# Patient Record
Sex: MALE | Race: WHITE | Employment: OTHER | URBAN - METROPOLITAN AREA
[De-identification: names, ages, dates, MRNs, and addresses within clinical notes are randomized per-mention and may not be internally consistent; named-entity substitution may affect disease eponyms.]

---

## 2017-01-11 ENCOUNTER — OFFICE VISIT (OUTPATIENT)
Dept: FAMILY MEDICINE CLINIC | Facility: CLINIC | Age: 67
End: 2017-01-11

## 2017-01-11 VITALS
TEMPERATURE: 98 F | SYSTOLIC BLOOD PRESSURE: 139 MMHG | HEART RATE: 83 BPM | HEIGHT: 69 IN | WEIGHT: 193 LBS | RESPIRATION RATE: 18 BRPM | BODY MASS INDEX: 28.58 KG/M2 | DIASTOLIC BLOOD PRESSURE: 84 MMHG

## 2017-01-11 DIAGNOSIS — L30.9 DERMATITIS: Primary | ICD-10-CM

## 2017-01-11 PROCEDURE — 99213 OFFICE O/P EST LOW 20 MIN: CPT | Performed by: FAMILY MEDICINE

## 2017-01-11 PROCEDURE — 99212 OFFICE O/P EST SF 10 MIN: CPT | Performed by: FAMILY MEDICINE

## 2017-01-11 RX ORDER — DESONIDE 0.5 MG/G
1 CREAM TOPICAL 2 TIMES DAILY
Qty: 1 TUBE | Refills: 1 | Status: SHIPPED | OUTPATIENT
Start: 2017-01-11 | End: 2017-07-07 | Stop reason: ALTCHOICE

## 2017-01-11 NOTE — PROGRESS NOTES
Blood pressure 139/84, pulse 83, temperature 97.8 °F (36.6 °C), temperature source Oral, resp. rate 18, height 5' 9\" (1.753 m), weight 193 lb (87.544 kg). Patient presents today complaining of an erythematous rash underneath his right eye.   He had been p

## 2017-01-25 ENCOUNTER — TELEPHONE (OUTPATIENT)
Dept: FAMILY MEDICINE CLINIC | Facility: CLINIC | Age: 67
End: 2017-01-25

## 2017-01-25 NOTE — TELEPHONE ENCOUNTER
Pt calling wanting to let  [facility] know that the medication Desonide (DESOWEN) 0.05 % External Cream worked. .. please advise

## 2017-03-30 ENCOUNTER — OFFICE VISIT (OUTPATIENT)
Dept: DERMATOLOGY CLINIC | Facility: CLINIC | Age: 67
End: 2017-03-30

## 2017-03-30 DIAGNOSIS — L82.1 SEBORRHEIC KERATOSES: ICD-10-CM

## 2017-03-30 DIAGNOSIS — D23.9 BENIGN NEOPLASM OF SKIN, UNSPECIFIED LOCATION: ICD-10-CM

## 2017-03-30 DIAGNOSIS — L30.9 DERMATITIS: Primary | ICD-10-CM

## 2017-03-30 PROCEDURE — 99212 OFFICE O/P EST SF 10 MIN: CPT | Performed by: DERMATOLOGY

## 2017-03-30 PROCEDURE — 99202 OFFICE O/P NEW SF 15 MIN: CPT | Performed by: DERMATOLOGY

## 2017-04-17 NOTE — PROGRESS NOTES
Kolby Herman is a 61year old male. Patient presents with:  Lesion: New pt presenting with lesions to R lower eye lid. Currnetly using desonide 0.05% cream.             Review of patient's allergies indicates no known allergies.     Current Outpatient P Pt has a pacemaker No    Pt has a defibrillator No    Reaction to local anesthetic No     Social History Narrative     Family History   Problem Relation Age of Onset   • Breast Cancer Mother    • Diabetes Paternal Grandfather    • Other[other] Zoe SABA know how they are doing over the next several weeks. Await clinical response to above therapy. Multiple benign keratoses  , Skin tags reassurance.   Consider removal with ophthalmology given the location right mid lower lid along lash line    RTC as not

## 2017-06-28 ENCOUNTER — TELEPHONE (OUTPATIENT)
Dept: FAMILY MEDICINE CLINIC | Facility: CLINIC | Age: 67
End: 2017-06-28

## 2017-06-28 DIAGNOSIS — E78.2 MIXED HYPERLIPIDEMIA: Primary | ICD-10-CM

## 2017-06-28 NOTE — TELEPHONE ENCOUNTER
Pt has appt with [facility] on 7-7-17 and would like to do his labwork before this, please send an order and call pt to let him know when its ready

## 2017-06-28 NOTE — TELEPHONE ENCOUNTER
Pt is requesting an order for lab before OV 07/0717. Last lab dated 11/10/16. Is it ok to generate the order? Thank you.

## 2017-07-06 ENCOUNTER — APPOINTMENT (OUTPATIENT)
Dept: LAB | Age: 67
End: 2017-07-06
Attending: FAMILY MEDICINE
Payer: MEDICARE

## 2017-07-06 DIAGNOSIS — E78.2 MIXED HYPERLIPIDEMIA: ICD-10-CM

## 2017-07-06 LAB
ALT SERPL-CCNC: 25 U/L (ref 17–63)
AST SERPL-CCNC: 26 U/L (ref 15–41)
CHOLEST SERPL-MCNC: 155 MG/DL (ref 110–200)
GLUCOSE SERPL-MCNC: 94 MG/DL (ref 70–99)
HDLC SERPL-MCNC: 60 MG/DL
LDLC SERPL CALC-MCNC: 76 MG/DL (ref 0–99)
NONHDLC SERPL-MCNC: 95 MG/DL
TRIGL SERPL-MCNC: 94 MG/DL (ref 1–149)

## 2017-07-06 PROCEDURE — 82947 ASSAY GLUCOSE BLOOD QUANT: CPT

## 2017-07-06 PROCEDURE — 84450 TRANSFERASE (AST) (SGOT): CPT

## 2017-07-06 PROCEDURE — 36415 COLL VENOUS BLD VENIPUNCTURE: CPT

## 2017-07-06 PROCEDURE — 84460 ALANINE AMINO (ALT) (SGPT): CPT

## 2017-07-06 PROCEDURE — 80061 LIPID PANEL: CPT

## 2017-07-07 ENCOUNTER — OFFICE VISIT (OUTPATIENT)
Dept: FAMILY MEDICINE CLINIC | Facility: CLINIC | Age: 67
End: 2017-07-07

## 2017-07-07 VITALS
DIASTOLIC BLOOD PRESSURE: 74 MMHG | BODY MASS INDEX: 29 KG/M2 | WEIGHT: 196.81 LBS | TEMPERATURE: 98 F | HEART RATE: 74 BPM | SYSTOLIC BLOOD PRESSURE: 130 MMHG

## 2017-07-07 DIAGNOSIS — E78.2 MIXED HYPERLIPIDEMIA: Primary | ICD-10-CM

## 2017-07-07 DIAGNOSIS — M54.50 CHRONIC MIDLINE LOW BACK PAIN WITHOUT SCIATICA: ICD-10-CM

## 2017-07-07 DIAGNOSIS — I10 ESSENTIAL HYPERTENSION: ICD-10-CM

## 2017-07-07 DIAGNOSIS — G89.29 CHRONIC MIDLINE LOW BACK PAIN WITHOUT SCIATICA: ICD-10-CM

## 2017-07-07 PROCEDURE — G0463 HOSPITAL OUTPT CLINIC VISIT: HCPCS | Performed by: FAMILY MEDICINE

## 2017-07-07 PROCEDURE — 99213 OFFICE O/P EST LOW 20 MIN: CPT | Performed by: FAMILY MEDICINE

## 2017-07-07 RX ORDER — LOSARTAN POTASSIUM 50 MG/1
50 TABLET ORAL DAILY
Qty: 90 TABLET | Refills: 3 | Status: SHIPPED | OUTPATIENT
Start: 2017-07-07 | End: 2017-09-29

## 2017-07-07 RX ORDER — ATORVASTATIN CALCIUM 20 MG/1
TABLET, FILM COATED ORAL
Qty: 90 TABLET | Refills: 3 | Status: SHIPPED | OUTPATIENT
Start: 2017-07-07 | End: 2017-09-29

## 2017-07-07 RX ORDER — CYCLOBENZAPRINE HCL 10 MG
10 TABLET ORAL NIGHTLY
Qty: 90 TABLET | Refills: 1 | Status: SHIPPED | OUTPATIENT
Start: 2017-07-07 | End: 2017-07-27

## 2017-07-07 RX ORDER — CYCLOBENZAPRINE HCL 10 MG
10 TABLET ORAL NIGHTLY
Qty: 30 TABLET | Refills: 1 | Status: SHIPPED | OUTPATIENT
Start: 2017-07-07 | End: 2017-07-07

## 2017-07-07 NOTE — PROGRESS NOTES
Blood pressure 130/74, pulse 74, temperature 98.2 °F (36.8 °C), temperature source Oral, weight 196 lb 12.8 oz (89.3 kg). FU FOR HTN AND hyperlipidemia complaining of chronic low back stiffness especially in the mornings. Remote history of trauma.     O

## 2017-09-29 ENCOUNTER — TELEPHONE (OUTPATIENT)
Dept: FAMILY MEDICINE CLINIC | Facility: CLINIC | Age: 67
End: 2017-09-29

## 2017-09-29 RX ORDER — LOSARTAN POTASSIUM 50 MG/1
50 TABLET ORAL DAILY
Qty: 90 TABLET | Refills: 2 | Status: SHIPPED | OUTPATIENT
Start: 2017-09-29 | End: 2017-11-10

## 2017-09-29 RX ORDER — ATORVASTATIN CALCIUM 20 MG/1
TABLET, FILM COATED ORAL
Qty: 90 TABLET | Refills: 2 | Status: SHIPPED | OUTPATIENT
Start: 2017-09-29 | End: 2018-08-08

## 2017-09-29 NOTE — TELEPHONE ENCOUNTER
Refilled per protocol.       Hypertensive Medications  Protocol Criteria:  · Appointment scheduled in the past 6 months or in the next 3 months  · BMP or CMP in the past 12 months  · Creatinine result < 2  Recent Outpatient Visits            2 months ago Mi

## 2017-09-29 NOTE — TELEPHONE ENCOUNTER
Tabitha Hanna pt is out of town and forgot his meds at home requesting refill. Please advise. Current Outpatient Prescriptions:  Losartan Potassium 50 MG Oral Tab Take 1 tablet (50 mg total) by mouth daily.  Disp: 90 tablet Rfl: 3   atorvastatin 20

## 2017-10-13 ENCOUNTER — PATIENT OUTREACH (OUTPATIENT)
Dept: INTERNAL MEDICINE CLINIC | Facility: CLINIC | Age: 67
End: 2017-10-13

## 2017-10-18 ENCOUNTER — OFFICE VISIT (OUTPATIENT)
Dept: FAMILY MEDICINE CLINIC | Facility: CLINIC | Age: 67
End: 2017-10-18

## 2017-10-18 VITALS
WEIGHT: 193 LBS | SYSTOLIC BLOOD PRESSURE: 148 MMHG | BODY MASS INDEX: 28.26 KG/M2 | HEIGHT: 69.25 IN | DIASTOLIC BLOOD PRESSURE: 86 MMHG

## 2017-10-18 DIAGNOSIS — Z00.00 WELCOME TO MEDICARE PREVENTIVE VISIT: Primary | ICD-10-CM

## 2017-10-18 DIAGNOSIS — E78.2 MIXED HYPERLIPIDEMIA: ICD-10-CM

## 2017-10-18 DIAGNOSIS — Z12.5 PROSTATE CANCER SCREENING: ICD-10-CM

## 2017-10-18 DIAGNOSIS — I10 ESSENTIAL HYPERTENSION: ICD-10-CM

## 2017-10-18 DIAGNOSIS — Z00.00 ENCOUNTER FOR ANNUAL HEALTH EXAMINATION: ICD-10-CM

## 2017-10-18 PROCEDURE — 90670 PCV13 VACCINE IM: CPT | Performed by: FAMILY MEDICINE

## 2017-10-18 PROCEDURE — G0009 ADMIN PNEUMOCOCCAL VACCINE: HCPCS | Performed by: FAMILY MEDICINE

## 2017-10-18 PROCEDURE — G0402 INITIAL PREVENTIVE EXAM: HCPCS | Performed by: FAMILY MEDICINE

## 2017-10-18 RX ORDER — ASPIRIN 81 MG/1
81 TABLET ORAL DAILY
Qty: 30 TABLET | Refills: 4 | OUTPATIENT
Start: 2017-10-18 | End: 2021-08-03

## 2017-10-18 NOTE — PROGRESS NOTES
HPI:   Bertha Grove is a 74year old male who presents for a Medicare Initial Preventative Physical Exam (Welcome to Medicare- < 12 months on Medicare).       His last annual assessment has been over 1 year: Annual Physical due on 06/21/2017         Patient reports that he does not use drugs.      REVIEW OF SYSTEMS:   GENERAL: feels well otherwise  SKIN: denies any unusual skin lesions  EYES: denies blurred vision or double vision  HEENT: denies nasal congestion, sinus pain or ST  LUNGS: denies shortness of br what they say:  No   I misunderstand what others are saying and make inappropriate responses:  No I avoid social activities because I cannot hear well and fear I will reply improperly:  No   Family members and friends have told me they think I may have hea preventive visit  -     EKG 12-LEAD  -     AST (SGOT); Future  -     BASIC METABOLIC PANEL (8); Future  -     ALT (SGPT); Future  -     PSA SCREEN; Future    Mixed hyperlipidemia  -     AST (SGOT); Future  -     BASIC METABOLIC PANEL (8);  Future  -     ALT without help    Dressing: Able without help    Eating: Able without help    Driving: Able without help    Preparing your meals: Able without help    Managing money/bills: Able without help    Taking medications as prescribed: Able without help    Are you a years Colonoscopy,3 Years due on 07/02/2018 Update Health Maintenance if applicable    Flex Sigmoidoscopy Screen every 10 years No results found for this or any previous visit. No flowsheet data found.      Fecal Occult Blood Annually No results found for: data found. 1. Welcome to Medicare preventive visit    - EKG 12-LEAD  - AST (SGOT); Future  - BASIC METABOLIC PANEL (8); Future  - ALT (SGPT); Future  - PSA SCREEN; Future    2. Mixed hyperlipidemia    - AST (SGOT);  Future  - BASIC METABOLIC PANEL (

## 2017-10-18 NOTE — PATIENT INSTRUCTIONS
Ene Tavera's SCREENING SCHEDULE   Tests on this list are recommended by your physician but may not be covered, or covered at this frequency, by your insurer. Please check with your insurance carrier before scheduling to verify coverage.     PREVENTATIVE Colorectal Cancer Screening Covered up to Age 76     Colonoscopy Screen   Covered every 10 years- more often if abnormal Colonoscopy,3 Years due on 07/02/2018 Update Health Maintenance if applicable    Flex Sigmoidoscopy Screen  Covered every 5 years No B) No orders found for this or any previous visit. This may be covered with your prescription benefits, but Medicare does not cover unless Medically needed    Zoster (Not covered by Medicare Part B) No orders found for this or any previous visit.  This may

## 2017-10-19 ENCOUNTER — PATIENT OUTREACH (OUTPATIENT)
Dept: FAMILY MEDICINE CLINIC | Facility: CLINIC | Age: 67
End: 2017-10-19

## 2017-10-19 NOTE — PROGRESS NOTES
Attempted to contact patient to discuss CCM enrollment. Patient not available. Left message to call back [phone].

## 2017-10-24 ENCOUNTER — PATIENT OUTREACH (OUTPATIENT)
Dept: FAMILY MEDICINE CLINIC | Facility: CLINIC | Age: 67
End: 2017-10-24

## 2017-10-24 NOTE — PROGRESS NOTES
Discussed CCM program in detail w/patient. Patient has declined enrollment at this time. Patient declined CCM information letter.

## 2017-10-25 ENCOUNTER — APPOINTMENT (OUTPATIENT)
Dept: LAB | Age: 67
End: 2017-10-25
Attending: FAMILY MEDICINE
Payer: MEDICARE

## 2017-10-25 DIAGNOSIS — Z00.00 WELCOME TO MEDICARE PREVENTIVE VISIT: ICD-10-CM

## 2017-10-25 DIAGNOSIS — E78.2 MIXED HYPERLIPIDEMIA: ICD-10-CM

## 2017-10-25 DIAGNOSIS — Z12.5 PROSTATE CANCER SCREENING: ICD-10-CM

## 2017-10-25 PROCEDURE — 36415 COLL VENOUS BLD VENIPUNCTURE: CPT

## 2017-10-25 PROCEDURE — 84450 TRANSFERASE (AST) (SGOT): CPT

## 2017-10-25 PROCEDURE — 80048 BASIC METABOLIC PNL TOTAL CA: CPT

## 2017-10-25 PROCEDURE — 80061 LIPID PANEL: CPT

## 2017-10-25 PROCEDURE — 84460 ALANINE AMINO (ALT) (SGPT): CPT

## 2017-11-10 ENCOUNTER — OFFICE VISIT (OUTPATIENT)
Dept: FAMILY MEDICINE CLINIC | Facility: CLINIC | Age: 67
End: 2017-11-10

## 2017-11-10 VITALS
DIASTOLIC BLOOD PRESSURE: 86 MMHG | RESPIRATION RATE: 18 BRPM | WEIGHT: 196 LBS | BODY MASS INDEX: 29.03 KG/M2 | SYSTOLIC BLOOD PRESSURE: 142 MMHG | HEIGHT: 69 IN | TEMPERATURE: 98 F | HEART RATE: 75 BPM

## 2017-11-10 DIAGNOSIS — I10 ESSENTIAL HYPERTENSION: Primary | ICD-10-CM

## 2017-11-10 PROCEDURE — 99213 OFFICE O/P EST LOW 20 MIN: CPT | Performed by: FAMILY MEDICINE

## 2017-11-10 PROCEDURE — G0463 HOSPITAL OUTPT CLINIC VISIT: HCPCS | Performed by: FAMILY MEDICINE

## 2017-11-10 RX ORDER — LOSARTAN POTASSIUM 100 MG/1
100 TABLET ORAL DAILY
Qty: 90 TABLET | Refills: 0 | Status: SHIPPED | OUTPATIENT
Start: 2017-11-10 | End: 2018-02-07

## 2017-11-10 NOTE — PROGRESS NOTES
Blood pressure 142/86, pulse 75, temperature 97.9 °F (36.6 °C), temperature source Oral, resp. rate 18, height 5' 9\" (1.753 m), weight 196 lb (88.9 kg). Patient presents today following up for hypertension has home blood pressure readings are elevated.

## 2018-02-07 RX ORDER — LOSARTAN POTASSIUM 100 MG/1
TABLET ORAL
Qty: 90 TABLET | Refills: 0 | Status: SHIPPED | OUTPATIENT
Start: 2018-02-07 | End: 2018-04-25

## 2018-02-07 NOTE — TELEPHONE ENCOUNTER
Hypertensive Medications: Refilled per protocol    Protocol Criteria:  · Appointment scheduled in the past 6 months or in the next 3 months  · BMP or CMP in the past 12 months  · Creatinine result < 2  Recent Outpatient Visits            2 months ago Yanira

## 2018-04-25 ENCOUNTER — OFFICE VISIT (OUTPATIENT)
Dept: FAMILY MEDICINE CLINIC | Facility: CLINIC | Age: 68
End: 2018-04-25

## 2018-04-25 VITALS
HEIGHT: 69 IN | BODY MASS INDEX: 29.62 KG/M2 | DIASTOLIC BLOOD PRESSURE: 82 MMHG | SYSTOLIC BLOOD PRESSURE: 160 MMHG | WEIGHT: 200 LBS | HEART RATE: 82 BPM

## 2018-04-25 DIAGNOSIS — E78.2 MIXED HYPERLIPIDEMIA: Primary | ICD-10-CM

## 2018-04-25 DIAGNOSIS — Z12.5 PROSTATE CANCER SCREENING: ICD-10-CM

## 2018-04-25 PROCEDURE — 99213 OFFICE O/P EST LOW 20 MIN: CPT | Performed by: FAMILY MEDICINE

## 2018-04-25 PROCEDURE — G0463 HOSPITAL OUTPT CLINIC VISIT: HCPCS | Performed by: FAMILY MEDICINE

## 2018-04-25 RX ORDER — VALSARTAN 320 MG/1
320 TABLET ORAL DAILY
Qty: 30 TABLET | Refills: 1 | Status: SHIPPED | OUTPATIENT
Start: 2018-04-25 | End: 2018-05-11

## 2018-04-25 NOTE — PROGRESS NOTES
Blood pressure 160/82, pulse 82, height 5' 9\" (1.753 m), weight 200 lb (90.7 kg). Presents today following up for elevated blood pressure readings at home denies chest pain on exertion no dyspnea on exertion. Otherwise feels well.     Objective patient

## 2018-05-11 RX ORDER — VALSARTAN 320 MG/1
TABLET ORAL
Qty: 90 TABLET | Refills: 3 | Status: SHIPPED | OUTPATIENT
Start: 2018-05-11 | End: 2018-07-18

## 2018-05-21 ENCOUNTER — APPOINTMENT (OUTPATIENT)
Dept: LAB | Age: 68
End: 2018-05-21
Attending: FAMILY MEDICINE
Payer: MEDICARE

## 2018-05-21 DIAGNOSIS — E78.2 MIXED HYPERLIPIDEMIA: ICD-10-CM

## 2018-05-21 DIAGNOSIS — Z12.5 PROSTATE CANCER SCREENING: ICD-10-CM

## 2018-05-21 DIAGNOSIS — Z00.00 WELCOME TO MEDICARE PREVENTIVE VISIT: ICD-10-CM

## 2018-05-21 PROCEDURE — 84443 ASSAY THYROID STIM HORMONE: CPT

## 2018-05-21 PROCEDURE — 84460 ALANINE AMINO (ALT) (SGPT): CPT

## 2018-05-21 PROCEDURE — 84450 TRANSFERASE (AST) (SGOT): CPT

## 2018-05-21 PROCEDURE — 80048 BASIC METABOLIC PNL TOTAL CA: CPT

## 2018-05-21 PROCEDURE — 36415 COLL VENOUS BLD VENIPUNCTURE: CPT

## 2018-05-21 PROCEDURE — 80061 LIPID PANEL: CPT

## 2018-05-22 ENCOUNTER — OFFICE VISIT (OUTPATIENT)
Dept: FAMILY MEDICINE CLINIC | Facility: CLINIC | Age: 68
End: 2018-05-22

## 2018-05-22 VITALS
WEIGHT: 200.31 LBS | SYSTOLIC BLOOD PRESSURE: 135 MMHG | HEART RATE: 72 BPM | BODY MASS INDEX: 29.67 KG/M2 | DIASTOLIC BLOOD PRESSURE: 80 MMHG | HEIGHT: 69 IN

## 2018-05-22 DIAGNOSIS — E78.2 MIXED HYPERLIPIDEMIA: Primary | ICD-10-CM

## 2018-05-22 DIAGNOSIS — I10 ESSENTIAL HYPERTENSION: ICD-10-CM

## 2018-05-22 PROCEDURE — G0463 HOSPITAL OUTPT CLINIC VISIT: HCPCS | Performed by: FAMILY MEDICINE

## 2018-05-22 PROCEDURE — 99212 OFFICE O/P EST SF 10 MIN: CPT | Performed by: FAMILY MEDICINE

## 2018-06-04 ENCOUNTER — TELEPHONE (OUTPATIENT)
Dept: GASTROENTEROLOGY | Facility: CLINIC | Age: 68
End: 2018-06-04

## 2018-06-04 NOTE — TELEPHONE ENCOUNTER
----- Message from Tasha Velasco RN sent at 8/31/2015  2:46 PM CDT -----  Regarding: Recall Colon  Recall colon in 3 years per PL.  Colon done 7/2/15

## 2018-06-25 ENCOUNTER — HOSPITAL ENCOUNTER (OUTPATIENT)
Dept: CT IMAGING | Age: 68
Discharge: HOME | End: 2018-06-25
Attending: INTERNAL MEDICINE

## 2018-06-25 DIAGNOSIS — Z13.6 SCREENING FOR CARDIOVASCULAR CONDITION: ICD-10-CM

## 2018-06-25 NOTE — PROGRESS NOTES
Pt seen at [facility] for CTHS:  PRELIMINARY SCORE= 280.29  BP= 135/80  Cholestec labs as follows: Labs from 5/21/18  TC= 165  HDL= 73  LDL= 81  TG= 53  GLUCOSE= 97  All results and risk factors discussed with patient; all questions and concerns addres

## 2018-07-10 ENCOUNTER — TELEPHONE (OUTPATIENT)
Dept: FAMILY MEDICINE CLINIC | Facility: CLINIC | Age: 68
End: 2018-07-10

## 2018-07-10 NOTE — TELEPHONE ENCOUNTER
Spoke with pt and he states he heard on the news that Valsartan has been recalled in [place] due to cancer causing agent found in drug. Pt states he asked his pharmacist who told him he knew nothing of the recall or cancer causing agent.     Pt would like

## 2018-07-10 NOTE — TELEPHONE ENCOUNTER
CHECKED WITH TWO PHARMACIES AND VALSARTAN IS SAFE TO TAKE. WILL CHANGE TO DIFFERENT MEDICATION IF PATIENT PREFERS.

## 2018-07-10 NOTE — TELEPHONE ENCOUNTER
Pt has question had in regards to medication he is taking and it possibly being on recall. Pt would like a call back. Please advise.       Current Outpatient Prescriptions:  VALSARTAN 320 MG Oral Tab TAKE 1 TABLET(320 MG) BY MOUTH DAILY Disp: 90 tablet Rfl:

## 2018-07-11 NOTE — TELEPHONE ENCOUNTER
Pt was inform of  message below and he verbalized understanding. He stated that he will think about it and call us back.  THanks

## 2018-07-18 ENCOUNTER — TELEPHONE (OUTPATIENT)
Dept: FAMILY MEDICINE CLINIC | Facility: CLINIC | Age: 68
End: 2018-07-18

## 2018-07-18 RX ORDER — TELMISARTAN 80 MG/1
TABLET ORAL
Qty: 90 TABLET | Refills: 0 | Status: SHIPPED | OUTPATIENT
Start: 2018-07-18 | End: 2018-07-25

## 2018-07-18 RX ORDER — TELMISARTAN 80 MG/1
80 TABLET ORAL DAILY
Qty: 30 TABLET | Refills: 1 | Status: SHIPPED | OUTPATIENT
Start: 2018-07-18 | End: 2018-07-18

## 2018-07-23 NOTE — TELEPHONE ENCOUNTER
Reviewed Dr ROSSY Hooker orders 7/18/18 with pt who verbalized understanding and agreed with md plan.

## 2018-07-25 ENCOUNTER — TELEPHONE (OUTPATIENT)
Dept: INTERNAL MEDICINE CLINIC | Facility: CLINIC | Age: 68
End: 2018-07-25

## 2018-07-25 RX ORDER — LOSARTAN POTASSIUM 100 MG/1
100 TABLET ORAL DAILY
Qty: 90 TABLET | Refills: 3 | Status: SHIPPED | OUTPATIENT
Start: 2018-07-25 | End: 2018-07-26

## 2018-07-25 NOTE — TELEPHONE ENCOUNTER
Pt was prescribed telmisartan and does not want to take that medication because it causes back pain and he already expericences back pain and he can not take ibprofen with that. Pt would like to know if he can take chlorthalidone tabs 25mg or 50mg.  Pt wou

## 2018-07-25 NOTE — TELEPHONE ENCOUNTER
Patient stopped by office today stating his medication is too expensive would like an alternative medication.

## 2018-07-26 RX ORDER — OLMESARTAN MEDOXOMIL 20 MG/1
20 TABLET ORAL DAILY
Qty: 30 TABLET | Refills: 1 | Status: SHIPPED | OUTPATIENT
Start: 2018-07-26 | End: 2018-08-14

## 2018-07-26 RX ORDER — OLMESARTAN MEDOXOMIL 20 MG/1
TABLET ORAL
Qty: 90 TABLET | Refills: 1 | OUTPATIENT
Start: 2018-07-26

## 2018-08-08 RX ORDER — ATORVASTATIN CALCIUM 20 MG/1
TABLET, FILM COATED ORAL
Qty: 90 TABLET | Refills: 0 | Status: SHIPPED | OUTPATIENT
Start: 2018-08-08 | End: 2018-11-06

## 2018-08-09 NOTE — TELEPHONE ENCOUNTER
Cholesterol Medications  Protocol Criteria:  · Appointment scheduled in the past 12 months or in the next 3 months  · ALT & LDL on file in the past 12 months  · ALT result < 80  · LDL result <130   Recent Outpatient Visits            2 months ago Mixed hyp

## 2018-08-14 ENCOUNTER — OFFICE VISIT (OUTPATIENT)
Dept: FAMILY MEDICINE CLINIC | Facility: CLINIC | Age: 68
End: 2018-08-14
Payer: MEDICARE

## 2018-08-14 VITALS
HEIGHT: 69 IN | WEIGHT: 200 LBS | HEART RATE: 84 BPM | SYSTOLIC BLOOD PRESSURE: 149 MMHG | TEMPERATURE: 98 F | RESPIRATION RATE: 16 BRPM | BODY MASS INDEX: 29.62 KG/M2 | DIASTOLIC BLOOD PRESSURE: 87 MMHG

## 2018-08-14 DIAGNOSIS — I10 ESSENTIAL HYPERTENSION: Primary | ICD-10-CM

## 2018-08-14 PROCEDURE — 99213 OFFICE O/P EST LOW 20 MIN: CPT | Performed by: FAMILY MEDICINE

## 2018-08-14 PROCEDURE — G0463 HOSPITAL OUTPT CLINIC VISIT: HCPCS | Performed by: FAMILY MEDICINE

## 2018-08-14 RX ORDER — OLMESARTAN MEDOXOMIL 40 MG/1
40 TABLET ORAL DAILY
Qty: 30 TABLET | Refills: 2 | Status: SHIPPED | OUTPATIENT
Start: 2018-08-14 | End: 2018-11-12

## 2018-08-14 NOTE — PROGRESS NOTES
Blood pressure 149/87, pulse 84, temperature 98.2 °F (36.8 °C), temperature source Oral, resp. rate 16, height 5' 9\" (1.753 m), weight 200 lb (90.7 kg).     Following up for hypertension reports he is taking olmesartan 20 mg daily no chest pain and no dysp

## 2018-08-15 ENCOUNTER — TELEPHONE (OUTPATIENT)
Dept: GASTROENTEROLOGY | Facility: CLINIC | Age: 68
End: 2018-08-15

## 2018-08-15 ENCOUNTER — OFFICE VISIT (OUTPATIENT)
Dept: GASTROENTEROLOGY | Facility: CLINIC | Age: 68
End: 2018-08-15
Payer: MEDICARE

## 2018-08-15 VITALS
WEIGHT: 200 LBS | HEIGHT: 69 IN | SYSTOLIC BLOOD PRESSURE: 142 MMHG | BODY MASS INDEX: 29.62 KG/M2 | HEART RATE: 72 BPM | DIASTOLIC BLOOD PRESSURE: 81 MMHG

## 2018-08-15 DIAGNOSIS — Z86.010 HISTORY OF COLONIC POLYPS: Primary | ICD-10-CM

## 2018-08-15 DIAGNOSIS — Z86.010 PERSONAL HISTORY OF COLONIC POLYPS: Primary | ICD-10-CM

## 2018-08-15 PROCEDURE — 99213 OFFICE O/P EST LOW 20 MIN: CPT | Performed by: INTERNAL MEDICINE

## 2018-08-15 PROCEDURE — G0463 HOSPITAL OUTPT CLINIC VISIT: HCPCS | Performed by: INTERNAL MEDICINE

## 2018-08-15 NOTE — PATIENT INSTRUCTIONS
Colonoscopy for history of colon polyps  - call your insurance about your cost to you questions  - colyte prep  - MAC sedation

## 2018-08-15 NOTE — PROGRESS NOTES
Jose M Pascal is a 79year old male. HPI:   Patient presents with:  Colonoscopy Screening: had Colonoscopy about 3 yrs ago      The patient is a 60-year-old male who has a history of hypertension and hyperlipidemia who follows back up in the office today. or dermatologic symptoms. PHYSICAL EXAM:   Blood pressure 142/81, pulse 72, height 5' 9\" (1.753 m), weight 200 lb (90.7 kg).     The patient appears their stated age and is in no acute distress  HEENT- anicteric sclera, neck no lymphadnopathy, OP- erin

## 2018-08-27 NOTE — TELEPHONE ENCOUNTER
Pt returned call attempt to transfer twice with no answer please call thank you     Please call on cell phone   [phone]

## 2018-08-27 NOTE — TELEPHONE ENCOUNTER
Pt had been calling to schedule this procedure  with [facility] KARI ,pt is very agitated over the phone ,but since I've known him from his previous visit with  ,I handle it well with him and I apologized for not having to call him as soon as we can.     Jeff

## 2018-10-10 ENCOUNTER — TELEPHONE (OUTPATIENT)
Dept: GASTROENTEROLOGY | Facility: CLINIC | Age: 68
End: 2018-10-10

## 2018-10-10 NOTE — TELEPHONE ENCOUNTER
Pt contacted. Yousif was not called to pharmacy. I contacted [facility] and left rx, may substitute with any generic insurance allow. Requested that they call pt when ready.

## 2018-10-23 ENCOUNTER — ANESTHESIA EVENT (OUTPATIENT)
Dept: ENDOSCOPY | Age: 68
End: 2018-10-23
Payer: MEDICARE

## 2018-10-23 ENCOUNTER — HOSPITAL ENCOUNTER (OUTPATIENT)
Age: 68
Setting detail: HOSPITAL OUTPATIENT SURGERY
Discharge: HOME | End: 2018-10-23
Attending: INTERNAL MEDICINE | Admitting: INTERNAL MEDICINE
Payer: MEDICARE

## 2018-10-23 ENCOUNTER — ANESTHESIA (OUTPATIENT)
Dept: ENDOSCOPY | Age: 68
End: 2018-10-23
Payer: MEDICARE

## 2018-10-23 VITALS
RESPIRATION RATE: 16 BRPM | OXYGEN SATURATION: 100 % | HEIGHT: 69 IN | WEIGHT: 192 LBS | SYSTOLIC BLOOD PRESSURE: 148 MMHG | DIASTOLIC BLOOD PRESSURE: 76 MMHG | BODY MASS INDEX: 28.44 KG/M2 | HEART RATE: 80 BPM

## 2018-10-23 DIAGNOSIS — Z86.010 PERSONAL HISTORY OF COLONIC POLYPS: ICD-10-CM

## 2018-10-23 PROCEDURE — 45385 COLONOSCOPY W/LESION REMOVAL: CPT | Performed by: INTERNAL MEDICINE

## 2018-10-23 PROCEDURE — 88305 TISSUE EXAM BY PATHOLOGIST: CPT | Performed by: INTERNAL MEDICINE

## 2018-10-23 PROCEDURE — 99070 SPECIAL SUPPLIES PHYS/QHP: CPT | Performed by: INTERNAL MEDICINE

## 2018-10-23 RX ORDER — LIDOCAINE HYDROCHLORIDE 10 MG/ML
INJECTION, SOLUTION EPIDURAL; INFILTRATION; INTRACAUDAL; PERINEURAL AS NEEDED
Status: DISCONTINUED | OUTPATIENT
Start: 2018-10-23 | End: 2018-10-23 | Stop reason: SURG

## 2018-10-23 RX ORDER — NALOXONE HYDROCHLORIDE 0.4 MG/ML
80 INJECTION, SOLUTION INTRAMUSCULAR; INTRAVENOUS; SUBCUTANEOUS AS NEEDED
Status: DISCONTINUED | OUTPATIENT
Start: 2018-10-23 | End: 2018-10-23

## 2018-10-23 RX ORDER — SODIUM CHLORIDE, SODIUM LACTATE, POTASSIUM CHLORIDE, CALCIUM CHLORIDE 600; 310; 30; 20 MG/100ML; MG/100ML; MG/100ML; MG/100ML
INJECTION, SOLUTION INTRAVENOUS CONTINUOUS
Status: DISCONTINUED | OUTPATIENT
Start: 2018-10-23 | End: 2018-10-23

## 2018-10-23 RX ADMIN — SODIUM CHLORIDE, SODIUM LACTATE, POTASSIUM CHLORIDE, CALCIUM CHLORIDE: 600; 310; 30; 20 INJECTION, SOLUTION INTRAVENOUS at 11:25:00

## 2018-10-23 RX ADMIN — SODIUM CHLORIDE, SODIUM LACTATE, POTASSIUM CHLORIDE, CALCIUM CHLORIDE: 600; 310; 30; 20 INJECTION, SOLUTION INTRAVENOUS at 11:50:00

## 2018-10-23 RX ADMIN — LIDOCAINE HYDROCHLORIDE 25 MG: 10 INJECTION, SOLUTION EPIDURAL; INFILTRATION; INTRACAUDAL; PERINEURAL at 11:25:00

## 2018-10-23 NOTE — ANESTHESIA PREPROCEDURE EVALUATION
Anesthesia PreOp Note    HPI:     Susan Ortiz is a 79year old male who presents for preoperative consultation requested by: Veto Arredondo MD    Date of Surgery: 10/23/2018    Procedure(s):  COLONOSCOPY  Indication: Personal history of colonic polyps Allergies    Family History   Problem Relation Age of Onset   • Breast Cancer Mother    • Diabetes Paternal Grandfather    • Other (Other[other]) Father      Social History    Socioeconomic History      Marital status:       Spouse name: Not on file Pulse: 80    Resp: 16    SpO2: 100%    Weight: 87.1 kg (192 lb)    Height: 1.753 m (5' 9\")         Anesthesia ROS/Med Hx and Physical Exam     Patient summary reviewed and Nursing notes reviewed    No history of anesthetic complications   Airway   Marcello

## 2018-10-23 NOTE — ANESTHESIA POSTPROCEDURE EVALUATION
Patient: Omar Hobson    Procedure Summary     Date:  10/23/18 Room / Location:  [facility] ENDOSCOPY 01 / [facility] ENDO    Anesthesia Start:  3807 Anesthesia Stop:  3529    Procedure:  COLONOSCOPY (N/A ) Diagnosis:       Personal history of colonic polyps      (po

## 2018-10-23 NOTE — OPERATIVE REPORT
[facility] - [facility] Endoscopy Report      Preoperative Diagnosis:  - history of colon polyps       Postoperative Diagnosis:  - colon polyp x 1  - diverticulosis  - internal hemorhoids      Procedure:    Colonoscopy     Surgeon:  Bhavya Ghosh M.D.

## 2018-10-25 ENCOUNTER — TELEPHONE (OUTPATIENT)
Dept: GASTROENTEROLOGY | Facility: CLINIC | Age: 68
End: 2018-10-25

## 2018-10-25 NOTE — TELEPHONE ENCOUNTER
----- Message from Andrew Lazaro MD sent at 10/24/2018  2:22 PM CDT -----  I wanted to get back to you with your colonoscopy results. You had one colon polyp removed which was benign.   I would advise a repeat colonoscopy in 5 years to make sure no new

## 2018-11-06 RX ORDER — ATORVASTATIN CALCIUM 20 MG/1
TABLET, FILM COATED ORAL
Qty: 90 TABLET | Refills: 0 | Status: SHIPPED | OUTPATIENT
Start: 2018-11-06 | End: 2019-02-18

## 2018-11-12 RX ORDER — OLMESARTAN MEDOXOMIL 40 MG/1
TABLET ORAL
Qty: 90 TABLET | Refills: 3 | Status: SHIPPED | OUTPATIENT
Start: 2018-11-12 | End: 2019-02-18

## 2018-11-12 RX ORDER — OLMESARTAN MEDOXOMIL 40 MG/1
TABLET ORAL
Qty: 90 TABLET | Refills: 3 | Status: SHIPPED | OUTPATIENT
Start: 2018-11-12 | End: 2019-01-23

## 2019-01-16 ENCOUNTER — OFFICE VISIT (OUTPATIENT)
Dept: FAMILY MEDICINE CLINIC | Facility: CLINIC | Age: 69
End: 2019-01-16
Payer: MEDICARE

## 2019-01-16 VITALS
TEMPERATURE: 99 F | SYSTOLIC BLOOD PRESSURE: 138 MMHG | DIASTOLIC BLOOD PRESSURE: 72 MMHG | WEIGHT: 200 LBS | BODY MASS INDEX: 29.62 KG/M2 | HEIGHT: 69 IN | HEART RATE: 80 BPM

## 2019-01-16 DIAGNOSIS — Z12.5 PROSTATE CANCER SCREENING: ICD-10-CM

## 2019-01-16 DIAGNOSIS — I10 ESSENTIAL HYPERTENSION: Primary | ICD-10-CM

## 2019-01-16 PROCEDURE — G0463 HOSPITAL OUTPT CLINIC VISIT: HCPCS | Performed by: FAMILY MEDICINE

## 2019-01-16 PROCEDURE — 99213 OFFICE O/P EST LOW 20 MIN: CPT | Performed by: FAMILY MEDICINE

## 2019-01-16 NOTE — PROGRESS NOTES
Blood pressure 138/72, pulse 80, temperature 98.6 °F (37 °C), temperature source Oral, height 5' 9\" (1.753 m), weight 200 lb (90.7 kg). Patient presents today following up for hypertension. Has a mole on his back he would like checked.   He otherwise f

## 2019-01-17 ENCOUNTER — TELEPHONE (OUTPATIENT)
Dept: FAMILY MEDICINE CLINIC | Facility: CLINIC | Age: 69
End: 2019-01-17

## 2019-01-17 NOTE — TELEPHONE ENCOUNTER
Pt is calling to ask about the procedure he will be getting on 01/23/19. Pt states he will be going out of town Feb 9th and is wondering if he will be able to be around water and the sun after getting this mole removed. Pt would like a call back.  Ple

## 2019-01-23 ENCOUNTER — OFFICE VISIT (OUTPATIENT)
Dept: FAMILY MEDICINE CLINIC | Facility: CLINIC | Age: 69
End: 2019-01-23
Payer: MEDICARE

## 2019-01-23 VITALS
DIASTOLIC BLOOD PRESSURE: 75 MMHG | BODY MASS INDEX: 29.18 KG/M2 | HEIGHT: 69 IN | WEIGHT: 197 LBS | HEART RATE: 82 BPM | SYSTOLIC BLOOD PRESSURE: 138 MMHG

## 2019-01-23 DIAGNOSIS — D22.9 ATYPICAL MOLE: Primary | ICD-10-CM

## 2019-01-23 PROCEDURE — 99213 OFFICE O/P EST LOW 20 MIN: CPT | Performed by: FAMILY MEDICINE

## 2019-01-23 PROCEDURE — G0463 HOSPITAL OUTPT CLINIC VISIT: HCPCS | Performed by: FAMILY MEDICINE

## 2019-01-23 NOTE — PROCEDURES
Informed consent obtained all questions answered anesthesia using lidocaine 1% with epinephrine performed 2.5 cm lesion excised specimen sent to pathology    Hemostasis using 3-0 Ethilon sutures x5 patient tolerated procedure well sterile technique employe

## 2019-01-23 NOTE — PROGRESS NOTES
Blood pressure 138/75, pulse 82, height 5' 9\" (1.753 m), weight 197 lb (89.4 kg). Patient presents today for removal of a suspicious mole in the middle of his back. Denies allergies.     Objective brown mole noted mid thoracic area level of T7 with

## 2019-02-05 ENCOUNTER — OFFICE VISIT (OUTPATIENT)
Dept: FAMILY MEDICINE CLINIC | Facility: CLINIC | Age: 69
End: 2019-02-05
Payer: MEDICARE

## 2019-02-05 VITALS
BODY MASS INDEX: 29.18 KG/M2 | HEIGHT: 69 IN | HEART RATE: 73 BPM | SYSTOLIC BLOOD PRESSURE: 155 MMHG | DIASTOLIC BLOOD PRESSURE: 78 MMHG | WEIGHT: 197 LBS

## 2019-02-05 DIAGNOSIS — D22.9 ATYPICAL MOLE: Primary | ICD-10-CM

## 2019-02-05 PROCEDURE — G0463 HOSPITAL OUTPT CLINIC VISIT: HCPCS | Performed by: FAMILY MEDICINE

## 2019-02-05 PROCEDURE — 99212 OFFICE O/P EST SF 10 MIN: CPT | Performed by: FAMILY MEDICINE

## 2019-02-05 NOTE — PROGRESS NOTES
Blood pressure 155/78, pulse 73, height 5' 9\" (1.753 m), weight 197 lb (89.4 kg). Following up for suture removal.  Some itching no discharge. To drive.     Objective sutures placed no erythema no discharge    Assessment seborrheic keratosis status pos

## 2019-02-18 ENCOUNTER — TELEPHONE (OUTPATIENT)
Dept: FAMILY MEDICINE CLINIC | Facility: CLINIC | Age: 69
End: 2019-02-18

## 2019-02-18 RX ORDER — ATORVASTATIN CALCIUM 20 MG/1
TABLET, FILM COATED ORAL
Qty: 90 TABLET | Refills: 3 | Status: SHIPPED | OUTPATIENT
Start: 2019-02-18 | End: 2020-01-29

## 2019-02-18 RX ORDER — TELMISARTAN 80 MG/1
80 TABLET ORAL DAILY
Qty: 90 TABLET | Refills: 0 | Status: SHIPPED | OUTPATIENT
Start: 2019-02-18 | End: 2019-06-05 | Stop reason: CLARIF

## 2019-02-18 NOTE — TELEPHONE ENCOUNTER
Pt called in about his BP medication listed below. He stated the Pharmacy is out of the medication and want have it until end March per Tabitha. Pt said he has only 2 weeks of his medication left. Wants to know what he should   do?     Please call

## 2019-05-14 ENCOUNTER — LAB ENCOUNTER (OUTPATIENT)
Dept: LAB | Age: 69
End: 2019-05-14
Attending: FAMILY MEDICINE
Payer: MEDICARE

## 2019-05-14 DIAGNOSIS — I10 ESSENTIAL HYPERTENSION: ICD-10-CM

## 2019-05-14 PROCEDURE — 84450 TRANSFERASE (AST) (SGOT): CPT

## 2019-05-14 PROCEDURE — 80048 BASIC METABOLIC PNL TOTAL CA: CPT

## 2019-05-14 PROCEDURE — 36415 COLL VENOUS BLD VENIPUNCTURE: CPT

## 2019-05-14 PROCEDURE — 85025 COMPLETE CBC W/AUTO DIFF WBC: CPT

## 2019-05-14 PROCEDURE — 84460 ALANINE AMINO (ALT) (SGPT): CPT

## 2019-05-14 PROCEDURE — 84443 ASSAY THYROID STIM HORMONE: CPT

## 2019-05-14 PROCEDURE — 80061 LIPID PANEL: CPT

## 2019-05-30 ENCOUNTER — APPOINTMENT (OUTPATIENT)
Dept: LAB | Age: 69
End: 2019-05-30
Attending: FAMILY MEDICINE
Payer: MEDICARE

## 2019-05-30 DIAGNOSIS — Z12.5 PROSTATE CANCER SCREENING: ICD-10-CM

## 2019-05-30 PROCEDURE — 36415 COLL VENOUS BLD VENIPUNCTURE: CPT

## 2019-06-05 ENCOUNTER — OFFICE VISIT (OUTPATIENT)
Dept: FAMILY MEDICINE CLINIC | Facility: CLINIC | Age: 69
End: 2019-06-05
Payer: MEDICARE

## 2019-06-05 ENCOUNTER — LAB ENCOUNTER (OUTPATIENT)
Dept: LAB | Age: 69
End: 2019-06-05
Attending: FAMILY MEDICINE
Payer: MEDICARE

## 2019-06-05 VITALS
DIASTOLIC BLOOD PRESSURE: 78 MMHG | HEIGHT: 69 IN | SYSTOLIC BLOOD PRESSURE: 136 MMHG | WEIGHT: 195 LBS | HEART RATE: 84 BPM | BODY MASS INDEX: 28.88 KG/M2

## 2019-06-05 DIAGNOSIS — I10 ESSENTIAL HYPERTENSION: ICD-10-CM

## 2019-06-05 DIAGNOSIS — I25.10 CORONARY ARTERY DISEASE DUE TO CALCIFIED CORONARY LESION: ICD-10-CM

## 2019-06-05 DIAGNOSIS — E78.2 MIXED HYPERLIPIDEMIA: ICD-10-CM

## 2019-06-05 DIAGNOSIS — I25.84 CORONARY ARTERY DISEASE DUE TO CALCIFIED CORONARY LESION: ICD-10-CM

## 2019-06-05 DIAGNOSIS — Z00.00 MEDICARE ANNUAL WELLNESS VISIT, SUBSEQUENT: Primary | ICD-10-CM

## 2019-06-05 DIAGNOSIS — Z00.00 ENCOUNTER FOR ANNUAL HEALTH EXAMINATION: ICD-10-CM

## 2019-06-05 DIAGNOSIS — Z00.00 MEDICARE ANNUAL WELLNESS VISIT, INITIAL: Primary | ICD-10-CM

## 2019-06-05 PROCEDURE — 93010 ELECTROCARDIOGRAM REPORT: CPT | Performed by: FAMILY MEDICINE

## 2019-06-05 PROCEDURE — 90732 PPSV23 VACC 2 YRS+ SUBQ/IM: CPT | Performed by: FAMILY MEDICINE

## 2019-06-05 PROCEDURE — 93005 ELECTROCARDIOGRAM TRACING: CPT

## 2019-06-05 PROCEDURE — 99213 OFFICE O/P EST LOW 20 MIN: CPT | Performed by: FAMILY MEDICINE

## 2019-06-05 PROCEDURE — G0438 PPPS, INITIAL VISIT: HCPCS | Performed by: FAMILY MEDICINE

## 2019-06-05 PROCEDURE — G0009 ADMIN PNEUMOCOCCAL VACCINE: HCPCS | Performed by: FAMILY MEDICINE

## 2019-06-05 PROCEDURE — G0463 HOSPITAL OUTPT CLINIC VISIT: HCPCS | Performed by: FAMILY MEDICINE

## 2019-06-05 RX ORDER — OLMESARTAN MEDOXOMIL 40 MG/1
TABLET ORAL
Refills: 3 | COMMUNITY
Start: 2019-05-20 | End: 2020-01-08 | Stop reason: ALTCHOICE

## 2019-06-05 NOTE — PATIENT INSTRUCTIONS
Melani Tavera's SCREENING SCHEDULE   Tests on this list are recommended by your physician but may not be covered, or covered at this frequency, by your insurer. Please check with your insurance carrier before scheduling to verify coverage.     PREVENTATIVE abnormal Colonoscopy due on 10/23/2023 Update [facility] if applicable    Flex Sigmoidoscopy Screen  Covered every 5 years No results found for this or any previous visit. No flowsheet data found.      Fecal Occult Blood   Covered Annually No result prescription benefits, but Medicare does not cover unless Medically needed    Zoster (Not covered by Medicare Part B) No orders found for this or any previous visit.  This may be covered with your pharmacy  prescription benefits     Recommended Websites for

## 2019-06-05 NOTE — PROGRESS NOTES
HPI:   Sharlotte Boxer is a 79year old male who presents for a Medicare Subsequent Annual Wellness visit (Pt already had Initial Annual Wellness). Presents today following up for hypertension, hyperlipidemia and history of abnormal coronary calcium score. Essential hypertension     Eustachian tube disorder     Welcome to Medicare preventive visit     Dermatitis     Prostate cancer screening    Wt Readings from Last 3 Encounters:  06/05/19 : 195 lb (88.5 kg)  02/05/19 : 197 lb (89.4 kg)  01/23/19 : 197 lb (8 exertion  CARDIOVASCULAR: denies chest pain on exertion  GI: denies abdominal pain, denies heartburn  : 1 per night nocturia, no complaint of urinary incontinence  MUSCULOSKELETAL: denies back pain  NEURO: denies headaches  PSYCHE: denies depression or a Acuity  Right Eye Visual Acuity: Uncorrected Right Eye Chart Acuity: 20/25   Left Eye Visual Acuity: Uncorrected Left Eye Chart Acuity: 20/25   Both Eyes Visual Acuity: Uncorrected Both Eyes Chart Acuity: 20/25   Able To Tolerate Visual Acuity: Yes      Ge for this visit:    Medicare annual wellness visit, subsequent  -     EKG 12-LEAD  -     OPTOMETRY - INTERNAL    Mixed hyperlipidemia    Essential hypertension    Coronary artery disease due to calcified coronary lesion  -     CARD ECHO STRESS ECHO/REST AND Glaucoma Screening      Ophthalmology Visit Annually: Diabetics, FHx Glaucoma, AA>50, > 65 No flowsheet data found.     Prostate Cancer Screening      PSA  Annually PSA due on 05/30/2021  Update Health Maintenance if applicable     Immunizations ( hyperlipidemia  Stable continue present medications    3. Essential hypertension  Stable continue present medications    4.  Coronary artery disease due to calcified coronary lesion  EKG today  - CARD ECHO STRESS ECHO/REST AND STRESS(CPT=93350/68049

## 2019-08-09 ENCOUNTER — NURSE TRIAGE (OUTPATIENT)
Dept: FAMILY MEDICINE CLINIC | Facility: CLINIC | Age: 69
End: 2019-08-09

## 2019-08-09 NOTE — TELEPHONE ENCOUNTER
Action Requested: Summary for Provider     []  Critical Lab, Recommendations Needed  [] Need Additional Advice  []   FYI    []   Need Orders  [] Need Medications Sent to Pharmacy  []  Other     SUMMARY: Per protocol advised home care.  Rest as much as possi

## 2019-08-29 ENCOUNTER — HOSPITAL ENCOUNTER (OUTPATIENT)
Dept: CV DIAGNOSTICS | Facility: HOSPITAL | Age: 69
Discharge: HOME | End: 2019-08-29
Attending: FAMILY MEDICINE
Payer: MEDICARE

## 2019-08-29 DIAGNOSIS — I25.10 CORONARY ARTERY DISEASE DUE TO CALCIFIED CORONARY LESION: ICD-10-CM

## 2019-08-29 DIAGNOSIS — I25.84 CORONARY ARTERY DISEASE DUE TO CALCIFIED CORONARY LESION: ICD-10-CM

## 2019-08-29 PROCEDURE — 93350 STRESS TTE ONLY: CPT | Performed by: FAMILY MEDICINE

## 2019-08-29 PROCEDURE — 93017 CV STRESS TEST TRACING ONLY: CPT | Performed by: FAMILY MEDICINE

## 2019-08-29 PROCEDURE — 93016 CV STRESS TEST SUPVJ ONLY: CPT | Performed by: FAMILY MEDICINE

## 2019-08-29 PROCEDURE — 93018 CV STRESS TEST I&R ONLY: CPT | Performed by: FAMILY MEDICINE

## 2019-11-18 RX ORDER — OLMESARTAN MEDOXOMIL 40 MG/1
TABLET ORAL
Qty: 90 TABLET | Refills: 1 | Status: SHIPPED | OUTPATIENT
Start: 2019-11-18 | End: 2020-03-17

## 2019-11-19 NOTE — TELEPHONE ENCOUNTER
Refill passed per [facility] protocol.   Hypertensive Medications  Protocol Criteria:  · Appointment scheduled in the past 6 months or in the next 3 months  · BMP or CMP in the past 12 months  · Creatinine result < 2  Recent Outpatient Visits

## 2020-01-08 ENCOUNTER — OFFICE VISIT (OUTPATIENT)
Dept: FAMILY MEDICINE CLINIC | Facility: CLINIC | Age: 70
End: 2020-01-08
Payer: MEDICARE

## 2020-01-08 VITALS
WEIGHT: 192.81 LBS | SYSTOLIC BLOOD PRESSURE: 124 MMHG | TEMPERATURE: 98 F | HEIGHT: 69 IN | RESPIRATION RATE: 18 BRPM | HEART RATE: 74 BPM | DIASTOLIC BLOOD PRESSURE: 80 MMHG | OXYGEN SATURATION: 98 % | BODY MASS INDEX: 28.56 KG/M2

## 2020-01-08 DIAGNOSIS — J01.90 ACUTE RHINOSINUSITIS: Primary | ICD-10-CM

## 2020-01-08 PROCEDURE — 99202 OFFICE O/P NEW SF 15 MIN: CPT | Performed by: PHYSICIAN ASSISTANT

## 2020-01-08 RX ORDER — CODEINE PHOSPHATE AND GUAIFENESIN 10; 100 MG/5ML; MG/5ML
5 SOLUTION ORAL NIGHTLY PRN
Qty: 80 ML | Refills: 0 | Status: SHIPPED | OUTPATIENT
Start: 2020-01-08 | End: 2020-01-22

## 2020-01-08 RX ORDER — AMOXICILLIN AND CLAVULANATE POTASSIUM 875; 125 MG/1; MG/1
1 TABLET, FILM COATED ORAL 2 TIMES DAILY
Qty: 14 TABLET | Refills: 0 | Status: SHIPPED | OUTPATIENT
Start: 2020-01-08 | End: 2020-01-15

## 2020-01-08 NOTE — PATIENT INSTRUCTIONS
Acute Bacterial Rhinosinusitis (ABRS)    Acute bacterial rhinosinusitis (ABRS) is an infection of your nasal cavity and sinuses. It’s caused by bacteria. Acute means that you’ve had symptoms for less than 4 weeks, but possibly up to 12 weeks.   Understand · Nasal corticosteroid medicine. Drops or spray used in the nose can lessen swelling and congestion. · Over-the-counter pain medicine. This is to lessen sinus pain and pressure. · Nasal decongestant medicine. Spray or drops may help to lessen congestion. Drinking extra fluids helps thin your mucus. This lets it drain from your sinuses more easily. Have a glass of water every hour or two. A humidifier helps in much the same way. Fluids can also offset the drying effects of certain medicines.  If you use a hu

## 2020-01-08 NOTE — PROGRESS NOTES
CHIEF COMPLAINT:   Patient presents with:  Cough/URI: started on 12/7 with normal cold sx, never felt better, 2 weeks ago cough worsened again, Mucinex, Cepacol, AlkaSeltzer+, + productive cough. no known fever.        HPI:   Sue Merchant is a 81year old ma EYES: conjunctiva clear, EOM intact  EARS: TM's non injected, no bulging, ++ retraction,no fluid  NOSE: Nostrils patent, mucoid nasal discharge, nasal mucosa reddened   THROAT: Oral mucosa pink, moist. Posterior pharynx is minimally erythematous.  No exudat The nasal cavity is the large air-filled space behind your nose. The sinuses are a group of spaces formed by the bones of your face. They connect with your nasal cavity. ABRS causes the tissue lining these spaces to become inflamed.  Mucus may not drain nor · Salt wash (saline irrigation). This can help to loosen mucus. Possible complications of ABRS  ABRS may come back or become long-term (chronic).  In rare cases, ABRS may cause complications such as:   · Inflamed tissue around the brain and spinal cord (me Rinses help keep your sinuses and nose moist. Mix a teaspoon of salt in 8 ounces of fresh, warm water. Use a bulb syringe to gently squirt the water into your nose a few times a day. You can also buy ready-made saline nasal sprays.   Apply hot or cold packs

## 2020-01-28 ENCOUNTER — TELEPHONE (OUTPATIENT)
Dept: FAMILY MEDICINE CLINIC | Facility: CLINIC | Age: 70
End: 2020-01-28

## 2020-01-28 DIAGNOSIS — I10 ESSENTIAL HYPERTENSION: Primary | ICD-10-CM

## 2020-01-28 NOTE — TELEPHONE ENCOUNTER
Patient is requesting an order to be put in for blood work. He has jacqueline appointment with Dr. Terrence Marshall on 02/25, and he would like to get it completed before his appointment.

## 2020-01-29 NOTE — TELEPHONE ENCOUNTER
Left detailed vm informing pt that blood work orders are in the system and he should fast prior to test.

## 2020-01-30 RX ORDER — ATORVASTATIN CALCIUM 20 MG/1
TABLET, FILM COATED ORAL
Qty: 90 TABLET | Refills: 1 | Status: SHIPPED | OUTPATIENT
Start: 2020-01-30 | End: 2020-07-23

## 2020-01-31 NOTE — TELEPHONE ENCOUNTER
Refill passed per [facility] protocol.   Cholesterol Medications  Protocol Criteria:  · Appointment scheduled in the past 12 months or in the next 3 months  · ALT & LDL on file in the past 12 months  · ALT result < 80  · LDL result <130   Recent Outpat

## 2020-02-21 ENCOUNTER — APPOINTMENT (OUTPATIENT)
Dept: LAB | Age: 70
End: 2020-02-21
Attending: FAMILY MEDICINE
Payer: MEDICARE

## 2020-02-21 DIAGNOSIS — I10 ESSENTIAL HYPERTENSION: ICD-10-CM

## 2020-02-21 LAB
ALBUMIN SERPL-MCNC: 3.9 G/DL (ref 3.4–5)
ALBUMIN/GLOB SERPL: 1.3 {RATIO} (ref 1–2)
ALP LIVER SERPL-CCNC: 79 U/L (ref 45–117)
ALT SERPL-CCNC: 24 U/L (ref 16–61)
ANION GAP SERPL CALC-SCNC: 5 MMOL/L (ref 0–18)
AST SERPL-CCNC: 16 U/L (ref 15–37)
BILIRUB SERPL-MCNC: 0.5 MG/DL (ref 0.1–2)
BUN BLD-MCNC: 14 MG/DL (ref 7–18)
BUN/CREAT SERPL: 15.4 (ref 10–20)
CALCIUM BLD-MCNC: 9.3 MG/DL (ref 8.5–10.1)
CHLORIDE SERPL-SCNC: 106 MMOL/L (ref 98–112)
CHOLEST SMN-MCNC: 154 MG/DL (ref ?–200)
CO2 SERPL-SCNC: 28 MMOL/L (ref 21–32)
CREAT BLD-MCNC: 0.91 MG/DL (ref 0.7–1.3)
GLOBULIN PLAS-MCNC: 3 G/DL (ref 2.8–4.4)
GLUCOSE BLD-MCNC: 95 MG/DL (ref 70–99)
HDLC SERPL-MCNC: 69 MG/DL (ref 40–59)
LDLC SERPL CALC-MCNC: 60 MG/DL (ref ?–100)
M PROTEIN MFR SERPL ELPH: 6.9 G/DL (ref 6.4–8.2)
NONHDLC SERPL-MCNC: 85 MG/DL (ref ?–130)
OSMOLALITY SERPL CALC.SUM OF ELEC: 288 MOSM/KG (ref 275–295)
PATIENT FASTING Y/N/NP: YES
PATIENT FASTING Y/N/NP: YES
POTASSIUM SERPL-SCNC: 4.6 MMOL/L (ref 3.5–5.1)
SODIUM SERPL-SCNC: 139 MMOL/L (ref 136–145)
TRIGL SERPL-MCNC: 125 MG/DL (ref 30–149)
TSI SER-ACNC: 1.7 MIU/ML (ref 0.36–3.74)
VLDLC SERPL CALC-MCNC: 25 MG/DL (ref 0–30)

## 2020-02-21 PROCEDURE — 36415 COLL VENOUS BLD VENIPUNCTURE: CPT

## 2020-02-21 PROCEDURE — 80061 LIPID PANEL: CPT

## 2020-02-21 PROCEDURE — 80053 COMPREHEN METABOLIC PANEL: CPT

## 2020-02-21 PROCEDURE — 84443 ASSAY THYROID STIM HORMONE: CPT

## 2020-02-26 ENCOUNTER — OFFICE VISIT (OUTPATIENT)
Dept: FAMILY MEDICINE CLINIC | Facility: CLINIC | Age: 70
End: 2020-02-26
Payer: MEDICARE

## 2020-02-26 VITALS
WEIGHT: 198 LBS | DIASTOLIC BLOOD PRESSURE: 90 MMHG | HEART RATE: 87 BPM | SYSTOLIC BLOOD PRESSURE: 162 MMHG | BODY MASS INDEX: 29.33 KG/M2 | HEIGHT: 69 IN

## 2020-02-26 DIAGNOSIS — I10 ESSENTIAL HYPERTENSION: Primary | ICD-10-CM

## 2020-02-26 DIAGNOSIS — E78.2 MIXED HYPERLIPIDEMIA: ICD-10-CM

## 2020-02-26 PROCEDURE — 99213 OFFICE O/P EST LOW 20 MIN: CPT | Performed by: FAMILY MEDICINE

## 2020-02-26 PROCEDURE — G0463 HOSPITAL OUTPT CLINIC VISIT: HCPCS | Performed by: FAMILY MEDICINE

## 2020-02-26 NOTE — PROGRESS NOTES
Blood pressure (!) 162/90, pulse 87, height 5' 9\" (1.753 m), weight 198 lb (89.8 kg). Patient presents today following up for hypertension and hyperlipidemia denies chest pain or dyspnea. Continues to exercise.     Objective patient is comfortable no a

## 2020-03-17 ENCOUNTER — OFFICE VISIT (OUTPATIENT)
Dept: FAMILY MEDICINE CLINIC | Facility: CLINIC | Age: 70
End: 2020-03-17
Payer: MEDICARE

## 2020-03-17 VITALS
SYSTOLIC BLOOD PRESSURE: 171 MMHG | HEIGHT: 69 IN | BODY MASS INDEX: 29 KG/M2 | DIASTOLIC BLOOD PRESSURE: 85 MMHG | HEART RATE: 75 BPM

## 2020-03-17 DIAGNOSIS — I10 ESSENTIAL HYPERTENSION: Primary | ICD-10-CM

## 2020-03-17 PROCEDURE — G0463 HOSPITAL OUTPT CLINIC VISIT: HCPCS | Performed by: FAMILY MEDICINE

## 2020-03-17 PROCEDURE — 99213 OFFICE O/P EST LOW 20 MIN: CPT | Performed by: FAMILY MEDICINE

## 2020-03-17 RX ORDER — OLMESARTAN MEDOXOMIL AND HYDROCHLOROTHIAZIDE 40/25 40; 25 MG/1; MG/1
1 TABLET ORAL DAILY
Qty: 30 TABLET | Refills: 2 | Status: SHIPPED | OUTPATIENT
Start: 2020-03-17 | End: 2020-04-09

## 2020-03-17 NOTE — PROGRESS NOTES
Blood pressure (!) 171/85, pulse 75, height 5' 9\" (1.753 m). Following up for hypertension denies chest pain or dyspnea.     Objective patient is comfortable no apparent distress Home blood pressure monitor is erroneous    Assessment hypertension    Unc

## 2020-04-09 ENCOUNTER — TELEPHONE (OUTPATIENT)
Dept: FAMILY MEDICINE CLINIC | Facility: CLINIC | Age: 70
End: 2020-04-09

## 2020-04-09 RX ORDER — OLMESARTAN MEDOXOMIL 40 MG/1
40 TABLET ORAL DAILY
Qty: 30 TABLET | Refills: 0 | COMMUNITY
Start: 2020-04-09 | End: 2020-04-18

## 2020-04-09 NOTE — TELEPHONE ENCOUNTER
Gutierrez Garland for patient to take olmesartan 40mg daily at this time. Please tell him to make a fu appt to see me in June.

## 2020-04-09 NOTE — TELEPHONE ENCOUNTER
pt stated  You had prescribed him  Olmesartan Medoxomil-HCTZ 40-25 MG Oral Tab and he only took it for 4 days but he stated that he felt very weak and tired. He stated that he checked his blood pressure and it was 113/38  (Per pt).  So he stoppe

## 2020-04-18 RX ORDER — OLMESARTAN MEDOXOMIL 40 MG/1
TABLET ORAL
Qty: 90 TABLET | Refills: 0 | Status: SHIPPED | OUTPATIENT
Start: 2020-04-18 | End: 2020-04-29

## 2020-04-29 ENCOUNTER — TELEPHONE (OUTPATIENT)
Dept: FAMILY MEDICINE CLINIC | Facility: CLINIC | Age: 70
End: 2020-04-29

## 2020-04-29 DIAGNOSIS — I10 ESSENTIAL HYPERTENSION: Primary | ICD-10-CM

## 2020-04-29 RX ORDER — OLMESARTAN MEDOXOMIL AND HYDROCHLOROTHIAZIDE 40/12.5 40; 12.5 MG/1; MG/1
1 TABLET ORAL DAILY
Qty: 30 TABLET | Refills: 1 | Status: SHIPPED | OUTPATIENT
Start: 2020-04-29 | End: 2020-05-15

## 2020-05-15 ENCOUNTER — TELEPHONE (OUTPATIENT)
Dept: FAMILY MEDICINE CLINIC | Facility: CLINIC | Age: 70
End: 2020-05-15

## 2020-05-15 RX ORDER — OLMESARTAN MEDOXOMIL 40 MG/1
40 TABLET ORAL DAILY
Qty: 90 TABLET | Refills: 0 | COMMUNITY
Start: 2020-05-15 | End: 2020-07-14

## 2020-05-15 NOTE — TELEPHONE ENCOUNTER
Dr Ruben Morgan, please advise. Will update med list once you respond. Patient stated he's not taken this olmesartan-HCTZ for about a week, he only took it 4 days.  He took it 4 days, 5/8-10, exercised 5/11 and \"didn't feel right,\" made him dizzy and that

## 2020-05-16 NOTE — TELEPHONE ENCOUNTER
Spoke with the patient and instructed him on Dr. Katty Mcdermott' order from below. Patient voiced understanding and agreed with the plan of care and scheduled an appointment for 7/14/20.

## 2020-05-23 RX ORDER — OLMESARTAN MEDOXOMIL AND HYDROCHLOROTHIAZIDE 40/12.5 40; 12.5 MG/1; MG/1
TABLET ORAL
Qty: 90 TABLET | Refills: 1 | Status: SHIPPED | OUTPATIENT
Start: 2020-05-23 | End: 2020-07-14

## 2020-06-09 RX ORDER — OLMESARTAN MEDOXOMIL AND HYDROCHLOROTHIAZIDE 40/25 40; 25 MG/1; MG/1
TABLET ORAL
Qty: 90 TABLET | Refills: 3 | Status: SHIPPED | OUTPATIENT
Start: 2020-06-09 | End: 2020-07-14

## 2020-07-14 ENCOUNTER — OFFICE VISIT (OUTPATIENT)
Dept: FAMILY MEDICINE CLINIC | Facility: CLINIC | Age: 70
End: 2020-07-14
Payer: MEDICARE

## 2020-07-14 VITALS
DIASTOLIC BLOOD PRESSURE: 81 MMHG | WEIGHT: 200 LBS | BODY MASS INDEX: 29.62 KG/M2 | SYSTOLIC BLOOD PRESSURE: 165 MMHG | HEART RATE: 81 BPM | HEIGHT: 69 IN

## 2020-07-14 DIAGNOSIS — Z12.5 PROSTATE CANCER SCREENING: ICD-10-CM

## 2020-07-14 DIAGNOSIS — E78.2 MIXED HYPERLIPIDEMIA: Primary | ICD-10-CM

## 2020-07-14 DIAGNOSIS — I10 ESSENTIAL HYPERTENSION: ICD-10-CM

## 2020-07-14 PROCEDURE — G0463 HOSPITAL OUTPT CLINIC VISIT: HCPCS | Performed by: FAMILY MEDICINE

## 2020-07-14 PROCEDURE — 99213 OFFICE O/P EST LOW 20 MIN: CPT | Performed by: FAMILY MEDICINE

## 2020-07-14 RX ORDER — LOSARTAN POTASSIUM 100 MG/1
100 TABLET ORAL DAILY
Qty: 30 TABLET | Refills: 1 | Status: SHIPPED | OUTPATIENT
Start: 2020-07-14 | End: 2020-08-07

## 2020-07-14 NOTE — PROGRESS NOTES
Blood pressure (!) 165/81, pulse 81, height 5' 9\" (1.753 m), weight 200 lb (90.7 kg). Denies chest pain or dyspnea feels dizzy when he exercises believes is related to the medication. Drinks a lot of water.     Objective patient is comfortable no appar

## 2020-07-23 RX ORDER — ATORVASTATIN CALCIUM 20 MG/1
TABLET, FILM COATED ORAL
Qty: 90 TABLET | Refills: 1 | Status: SHIPPED | OUTPATIENT
Start: 2020-07-23 | End: 2020-08-07

## 2020-07-29 ENCOUNTER — APPOINTMENT (OUTPATIENT)
Dept: LAB | Age: 70
End: 2020-07-29
Attending: FAMILY MEDICINE
Payer: MEDICARE

## 2020-07-29 DIAGNOSIS — E78.2 MIXED HYPERLIPIDEMIA: ICD-10-CM

## 2020-07-29 DIAGNOSIS — Z12.5 PROSTATE CANCER SCREENING: ICD-10-CM

## 2020-07-29 DIAGNOSIS — I10 ESSENTIAL HYPERTENSION: ICD-10-CM

## 2020-07-29 LAB
ALBUMIN SERPL-MCNC: 3.7 G/DL (ref 3.4–5)
ALBUMIN/GLOB SERPL: 1.1 {RATIO} (ref 1–2)
ALP LIVER SERPL-CCNC: 77 U/L (ref 45–117)
ALT SERPL-CCNC: 27 U/L (ref 16–61)
ANION GAP SERPL CALC-SCNC: 4 MMOL/L (ref 0–18)
AST SERPL-CCNC: 18 U/L (ref 15–37)
BILIRUB SERPL-MCNC: 0.5 MG/DL (ref 0.1–2)
BUN BLD-MCNC: 15 MG/DL (ref 7–18)
BUN/CREAT SERPL: 16 (ref 10–20)
CALCIUM BLD-MCNC: 9.3 MG/DL (ref 8.5–10.1)
CHLORIDE SERPL-SCNC: 107 MMOL/L (ref 98–112)
CHOLEST SMN-MCNC: 149 MG/DL (ref ?–200)
CO2 SERPL-SCNC: 29 MMOL/L (ref 21–32)
COMPLEXED PSA SERPL-MCNC: 3.54 NG/ML (ref ?–4)
CREAT BLD-MCNC: 0.94 MG/DL (ref 0.7–1.3)
GLOBULIN PLAS-MCNC: 3.3 G/DL (ref 2.8–4.4)
GLUCOSE BLD-MCNC: 93 MG/DL (ref 70–99)
HDLC SERPL-MCNC: 58 MG/DL (ref 40–59)
LDLC SERPL CALC-MCNC: 69 MG/DL (ref ?–100)
M PROTEIN MFR SERPL ELPH: 7 G/DL (ref 6.4–8.2)
NONHDLC SERPL-MCNC: 91 MG/DL (ref ?–130)
OSMOLALITY SERPL CALC.SUM OF ELEC: 291 MOSM/KG (ref 275–295)
PATIENT FASTING Y/N/NP: YES
PATIENT FASTING Y/N/NP: YES
POTASSIUM SERPL-SCNC: 4.2 MMOL/L (ref 3.5–5.1)
SODIUM SERPL-SCNC: 140 MMOL/L (ref 136–145)
TRIGL SERPL-MCNC: 109 MG/DL (ref 30–149)
TSI SER-ACNC: 2.17 MIU/ML (ref 0.36–3.74)
VLDLC SERPL CALC-MCNC: 22 MG/DL (ref 0–30)

## 2020-07-29 PROCEDURE — 80061 LIPID PANEL: CPT

## 2020-07-29 PROCEDURE — 36415 COLL VENOUS BLD VENIPUNCTURE: CPT

## 2020-07-29 PROCEDURE — 84443 ASSAY THYROID STIM HORMONE: CPT

## 2020-07-29 PROCEDURE — 80053 COMPREHEN METABOLIC PANEL: CPT

## 2020-08-07 ENCOUNTER — OFFICE VISIT (OUTPATIENT)
Dept: FAMILY MEDICINE CLINIC | Facility: CLINIC | Age: 70
End: 2020-08-07
Payer: MEDICARE

## 2020-08-07 ENCOUNTER — TELEPHONE (OUTPATIENT)
Dept: FAMILY MEDICINE CLINIC | Facility: CLINIC | Age: 70
End: 2020-08-07

## 2020-08-07 VITALS
SYSTOLIC BLOOD PRESSURE: 126 MMHG | WEIGHT: 200 LBS | BODY MASS INDEX: 29.62 KG/M2 | HEART RATE: 89 BPM | DIASTOLIC BLOOD PRESSURE: 82 MMHG | HEIGHT: 69 IN

## 2020-08-07 DIAGNOSIS — E78.2 MIXED HYPERLIPIDEMIA: ICD-10-CM

## 2020-08-07 DIAGNOSIS — I10 ESSENTIAL HYPERTENSION: ICD-10-CM

## 2020-08-07 DIAGNOSIS — I25.84 CORONARY ARTERY DISEASE DUE TO CALCIFIED CORONARY LESION: ICD-10-CM

## 2020-08-07 DIAGNOSIS — Z00.00 MEDICARE ANNUAL WELLNESS VISIT, SUBSEQUENT: Primary | ICD-10-CM

## 2020-08-07 DIAGNOSIS — I25.10 CORONARY ARTERY DISEASE DUE TO CALCIFIED CORONARY LESION: ICD-10-CM

## 2020-08-07 DIAGNOSIS — Z00.00 ENCOUNTER FOR ANNUAL HEALTH EXAMINATION: ICD-10-CM

## 2020-08-07 PROCEDURE — G0439 PPPS, SUBSEQ VISIT: HCPCS | Performed by: FAMILY MEDICINE

## 2020-08-07 RX ORDER — ATORVASTATIN CALCIUM 20 MG/1
TABLET, FILM COATED ORAL
Qty: 90 TABLET | Refills: 1 | Status: SHIPPED | OUTPATIENT
Start: 2020-08-07 | End: 2021-02-02

## 2020-08-07 RX ORDER — LOSARTAN POTASSIUM 100 MG/1
100 TABLET ORAL DAILY
Qty: 90 TABLET | Refills: 1 | Status: SHIPPED | OUTPATIENT
Start: 2020-08-07 | End: 2021-02-02

## 2020-08-07 NOTE — TELEPHONE ENCOUNTER
Left message for patient, please inform patient of EKG being part of a Medicare Px. Test could be completed at the same time as blood tests.      Medicare annual wellness visit, subsequent  -     EKG 12-LEAD  -     OPTOMETRY - INTERNAL

## 2020-08-07 NOTE — PATIENT INSTRUCTIONS
Fall Prevention  Falls often occur due to slipping, tripping or losing your balance. Millions of people fall every year and injure themselves. Here are ways to reduce your risk of falling again.    · Think about your fall, was there anything that caused y · If you have pets, know where they are before you stand up or walk so you don't trip over them. · Use night lights. · Go over all your medicines with a pharmacist or other healthcare provider to see if any of them could make you more likely to fall.   St EKG - covered if needed at Welcome to Medicare, and non-screening if indicated for medical reasons    Electrocardiogram date06/05/2019 Routine EKG is not a screening covered service except at the Welcome to Medicare Visit    Abdominal aortic aneurysm scre Covered Once after 65 Orders placed or performed in visit on 06/05/19   • PNEUMOCOCCAL IMM (PNEUMOVAX)    Please get once after your 65th birthday    Hepatitis B for Moderate/High Risk No orders found for this or any previous visit.  Medium/high risk factor

## 2020-08-07 NOTE — TELEPHONE ENCOUNTER
Patient states an EKG was ordered for him during visit on 8/7. Patient is confused as to why. Will also like to know when does Dr want this done by.  Please advice

## 2020-08-07 NOTE — PROGRESS NOTES
HPI:   Susan Ortiz is a 78year old male who presents for a Medicare Subsequent Annual Wellness visit (Pt already had Initial Annual Wellness).       His last annual assessment has been over 1 year: Annual Physical due on 06/05/2020         Fall/Risk Assess tube disorder     Welcome to Medicare preventive visit     Dermatitis     Prostate cancer screening    Wt Readings from Last 3 Encounters:  08/07/20 : 200 lb (90.7 kg)  07/14/20 : 200 lb (90.7 kg)  02/26/20 : 198 lb (89.8 kg)     Last Cholesterol Labs:   L complaint of urinary incontinence  MUSCULOSKELETAL: denies back pain  NEURO: denies headaches  PSYCHE: denies depression or anxiety  HEMATOLOGIC: denies hx of anemia  ENDOCRINE: denies thyroid history  ALL/ASTHMA: denies hx of allergy or asthma    EXAM: Chart Acuity: 20/20   Both Eyes Visual Acuity: Corrected Both Eyes Chart Acuity: 20/20   Able To Tolerate Visual Acuity: Yes      General Appearance:  Alert, cooperative, no distress, appears stated age   Head:  Normocephalic, without obvious abnormality, hyperlipidemia    Essential hypertension  -     COMP METABOLIC PANEL (14); Future  -     LIPID PANEL;  Future    Coronary artery disease due to calcified coronary lesion         Diet assessment: fair     PLAN:  The patient indicates understanding of these i Update Health Maintenance if applicable     Immunizations (Update Immunization Activity if applicable)     Influenza  Covered Annually 10/11/2019   Please get every year    Pneumococcal 13 (Prevnar)  Covered Once after 65 10/18/2017 Please get once after y

## 2020-08-08 RX ORDER — LOSARTAN POTASSIUM 100 MG/1
TABLET ORAL
Qty: 30 TABLET | Refills: 1 | Status: SHIPPED | OUTPATIENT
Start: 2020-08-08 | End: 2020-11-03

## 2020-08-31 RX ORDER — OLMESARTAN MEDOXOMIL 40 MG/1
TABLET ORAL
Qty: 90 TABLET | Refills: 0 | OUTPATIENT
Start: 2020-08-31

## 2020-11-03 RX ORDER — OLMESARTAN MEDOXOMIL 40 MG/1
TABLET ORAL
Qty: 90 TABLET | Refills: 0 | OUTPATIENT
Start: 2020-11-03

## 2020-11-03 NOTE — TELEPHONE ENCOUNTER
Meds updated. States he is no longer taking  OLMESARTAN MEDOXOMIL 40 MG TAB  States you replaced it with Losartan.

## 2020-11-27 NOTE — TELEPHONE ENCOUNTER
Lilli Drummond  or Rn's staff.   Pt will call us back once he finds out from his Insurance how much will this Colonoscopy Procedure cost.Pt was given and reviewed all the Instructions for Split dose Colyte and read all details on the Instructions and inf No

## 2021-01-21 ENCOUNTER — OFFICE VISIT (OUTPATIENT)
Dept: FAMILY MEDICINE CLINIC | Facility: CLINIC | Age: 71
End: 2021-01-21
Payer: MEDICARE

## 2021-01-21 ENCOUNTER — NURSE TRIAGE (OUTPATIENT)
Dept: FAMILY MEDICINE CLINIC | Facility: CLINIC | Age: 71
End: 2021-01-21

## 2021-01-21 VITALS
WEIGHT: 202 LBS | BODY MASS INDEX: 29.92 KG/M2 | DIASTOLIC BLOOD PRESSURE: 72 MMHG | SYSTOLIC BLOOD PRESSURE: 144 MMHG | HEIGHT: 69 IN | HEART RATE: 76 BPM

## 2021-01-21 DIAGNOSIS — E78.2 MIXED HYPERLIPIDEMIA: Primary | ICD-10-CM

## 2021-01-21 DIAGNOSIS — I10 ESSENTIAL HYPERTENSION: ICD-10-CM

## 2021-01-21 PROCEDURE — 99213 OFFICE O/P EST LOW 20 MIN: CPT | Performed by: FAMILY MEDICINE

## 2021-01-21 NOTE — PROGRESS NOTES
Blood pressure 144/72, pulse 76, height 5' 9\" (1.753 m), weight 202 lb (91.6 kg). Following up after a fall that occurred on his front steps 12 days ago. Slipped fell on his buttocks. Had a lot of swelling. Use ice for the first 5 days.   Pain is dim

## 2021-01-21 NOTE — TELEPHONE ENCOUNTER
Action Requested: Summary for Provider     []  Critical Lab, Recommendations Needed  [] Need Additional Advice  []   FYI    []   Need Orders  [] Need Medications Sent to Pharmacy  []  Other     SUMMARY: Per protocol advised : OV within 3 days     Future Ap

## 2021-02-02 RX ORDER — ATORVASTATIN CALCIUM 20 MG/1
TABLET, FILM COATED ORAL
Qty: 90 TABLET | Refills: 1 | Status: SHIPPED | OUTPATIENT
Start: 2021-02-02 | End: 2021-07-29

## 2021-02-02 RX ORDER — LOSARTAN POTASSIUM 100 MG/1
TABLET ORAL
Qty: 90 TABLET | Refills: 1 | Status: SHIPPED | OUTPATIENT
Start: 2021-02-02 | End: 2021-07-29

## 2021-02-10 ENCOUNTER — LAB ENCOUNTER (OUTPATIENT)
Dept: LAB | Age: 71
End: 2021-02-10
Attending: FAMILY MEDICINE
Payer: MEDICARE

## 2021-02-10 DIAGNOSIS — I10 ESSENTIAL HYPERTENSION: ICD-10-CM

## 2021-02-10 LAB
ALBUMIN SERPL-MCNC: 4 G/DL (ref 3.4–5)
ALBUMIN/GLOB SERPL: 1.3 {RATIO} (ref 1–2)
ALP LIVER SERPL-CCNC: 85 U/L
ALT SERPL-CCNC: 28 U/L
ANION GAP SERPL CALC-SCNC: 5 MMOL/L (ref 0–18)
AST SERPL-CCNC: 15 U/L (ref 15–37)
BILIRUB SERPL-MCNC: 0.4 MG/DL (ref 0.1–2)
BUN BLD-MCNC: 15 MG/DL (ref 7–18)
BUN/CREAT SERPL: 15 (ref 10–20)
CALCIUM BLD-MCNC: 10.2 MG/DL (ref 8.5–10.1)
CHLORIDE SERPL-SCNC: 105 MMOL/L (ref 98–112)
CHOLEST SMN-MCNC: 171 MG/DL (ref ?–200)
CO2 SERPL-SCNC: 30 MMOL/L (ref 21–32)
CREAT BLD-MCNC: 1 MG/DL
GLOBULIN PLAS-MCNC: 3 G/DL (ref 2.8–4.4)
GLUCOSE BLD-MCNC: 97 MG/DL (ref 70–99)
HDLC SERPL-MCNC: 62 MG/DL (ref 40–59)
LDLC SERPL CALC-MCNC: 81 MG/DL (ref ?–100)
M PROTEIN MFR SERPL ELPH: 7 G/DL (ref 6.4–8.2)
NONHDLC SERPL-MCNC: 109 MG/DL (ref ?–130)
OSMOLALITY SERPL CALC.SUM OF ELEC: 291 MOSM/KG (ref 275–295)
PATIENT FASTING Y/N/NP: YES
PATIENT FASTING Y/N/NP: YES
POTASSIUM SERPL-SCNC: 4.4 MMOL/L (ref 3.5–5.1)
SODIUM SERPL-SCNC: 140 MMOL/L (ref 136–145)
TRIGL SERPL-MCNC: 138 MG/DL (ref 30–149)
VLDLC SERPL CALC-MCNC: 28 MG/DL (ref 0–30)

## 2021-02-10 PROCEDURE — 80053 COMPREHEN METABOLIC PANEL: CPT

## 2021-02-10 PROCEDURE — 80061 LIPID PANEL: CPT

## 2021-02-10 PROCEDURE — 36415 COLL VENOUS BLD VENIPUNCTURE: CPT

## 2021-02-18 ENCOUNTER — OFFICE VISIT (OUTPATIENT)
Dept: FAMILY MEDICINE CLINIC | Facility: CLINIC | Age: 71
End: 2021-02-18
Payer: MEDICARE

## 2021-02-18 VITALS
BODY MASS INDEX: 29.8 KG/M2 | RESPIRATION RATE: 18 BRPM | HEART RATE: 77 BPM | DIASTOLIC BLOOD PRESSURE: 86 MMHG | SYSTOLIC BLOOD PRESSURE: 130 MMHG | WEIGHT: 201.19 LBS | HEIGHT: 69 IN

## 2021-02-18 DIAGNOSIS — E78.2 MIXED HYPERLIPIDEMIA: Primary | ICD-10-CM

## 2021-02-18 DIAGNOSIS — Z12.5 PROSTATE CANCER SCREENING: ICD-10-CM

## 2021-02-18 PROCEDURE — 99213 OFFICE O/P EST LOW 20 MIN: CPT | Performed by: FAMILY MEDICINE

## 2021-02-18 NOTE — PROGRESS NOTES
Blood pressure 130/86, pulse 77, resp. rate 18, height 5' 9\" (1.753 m), weight 201 lb 3 oz (91.3 kg). Following up for blood pressure. No chest pain and no dyspnea. No other complaints at this time.     Objective patient comfortable no apparent distre

## 2021-03-08 DIAGNOSIS — Z23 NEED FOR VACCINATION: ICD-10-CM

## 2021-07-29 ENCOUNTER — TELEPHONE (OUTPATIENT)
Dept: FAMILY MEDICINE CLINIC | Facility: CLINIC | Age: 71
End: 2021-07-29

## 2021-07-29 RX ORDER — LOSARTAN POTASSIUM 100 MG/1
TABLET ORAL
Qty: 90 TABLET | Refills: 1 | Status: SHIPPED | OUTPATIENT
Start: 2021-07-29 | End: 2022-01-24

## 2021-07-29 RX ORDER — ATORVASTATIN CALCIUM 20 MG/1
TABLET, FILM COATED ORAL
Qty: 90 TABLET | Refills: 1 | Status: SHIPPED | OUTPATIENT
Start: 2021-07-29 | End: 2022-01-24

## 2021-07-29 NOTE — TELEPHONE ENCOUNTER
Patient is requesting an order for MRI or XRAY for his head. He fell back in December and still feels effects from the fall. Please call when approved.   [phone]

## 2021-07-30 NOTE — TELEPHONE ENCOUNTER
RN pls call pt and triage or offer ov if needed, thanks. Reason for call was switched from orders to \"acute\". No future appointments.

## 2021-07-30 NOTE — TELEPHONE ENCOUNTER
Patient reports falling 12/2020 and hitting his right hip. Slipped on ice,down 4 stairs. Landed on right hip. Able to get up and walk away. Mentioned this at one of his last visits with you. Continues to have soreness to right hip. Ambulates with smooth gait. Denies any bruising or abnormal lumps at this time. Taking Ibuprofen and Aspirin PRN pain. -  Patient asking if he can get an order for an XR or would you like him in the office for an appointment.   Please advise, thanks

## 2021-08-03 ENCOUNTER — HOSPITAL ENCOUNTER (OUTPATIENT)
Dept: GENERAL RADIOLOGY | Age: 71
Discharge: HOME | End: 2021-08-03
Attending: FAMILY MEDICINE
Payer: MEDICARE

## 2021-08-03 ENCOUNTER — OFFICE VISIT (OUTPATIENT)
Dept: FAMILY MEDICINE CLINIC | Facility: CLINIC | Age: 71
End: 2021-08-03
Payer: MEDICARE

## 2021-08-03 VITALS
BODY MASS INDEX: 30.07 KG/M2 | WEIGHT: 203 LBS | SYSTOLIC BLOOD PRESSURE: 145 MMHG | HEART RATE: 75 BPM | DIASTOLIC BLOOD PRESSURE: 78 MMHG | HEIGHT: 69 IN

## 2021-08-03 DIAGNOSIS — M25.551 RIGHT HIP PAIN: ICD-10-CM

## 2021-08-03 DIAGNOSIS — M25.551 RIGHT HIP PAIN: Primary | ICD-10-CM

## 2021-08-03 PROCEDURE — 99213 OFFICE O/P EST LOW 20 MIN: CPT | Performed by: FAMILY MEDICINE

## 2021-08-03 PROCEDURE — 73502 X-RAY EXAM HIP UNI 2-3 VIEWS: CPT | Performed by: FAMILY MEDICINE

## 2021-08-03 RX ORDER — NAPROXEN 500 MG/1
500 TABLET ORAL 2 TIMES DAILY WITH MEALS
Qty: 60 TABLET | Refills: 1 | Status: SHIPPED | OUTPATIENT
Start: 2021-08-03 | End: 2021-08-24 | Stop reason: ALTCHOICE

## 2021-08-03 NOTE — PROGRESS NOTES
Blood pressure 145/78, pulse 75, height 5' 9\" (1.753 m), weight 203 lb (92.1 kg). Patient presents today following up for pelvic pain since his fall in December 2020. Has not had any relief he takes occasional ibuprofen.   The exercise he does aggravat

## 2021-08-24 ENCOUNTER — OFFICE VISIT (OUTPATIENT)
Dept: ORTHOPEDICS CLINIC | Facility: CLINIC | Age: 71
End: 2021-08-24
Payer: MEDICARE

## 2021-08-24 DIAGNOSIS — S70.01XS CONTUSION OF RIGHT HIP, SEQUELA: Primary | ICD-10-CM

## 2021-08-24 DIAGNOSIS — M46.1 OSTEOARTHRITIS OF RIGHT SACROILIAC JOINT (HCC): ICD-10-CM

## 2021-08-24 PROCEDURE — 99203 OFFICE O/P NEW LOW 30 MIN: CPT | Performed by: ORTHOPAEDIC SURGERY

## 2021-08-24 NOTE — H&P
NURSING INTAKE COMMENTS: Patient presents with:  Hip Pain: R hip pain, onset in December 2020. Fell down the stairs. Denies any numbness or tingling. Pain scale 8/10. HPI: This 73year old male presents today for right posterior hip and buttock pain. Smokeless tobacco: Never Used    Vaping Use      Vaping Use: Never used    Substance and Sexual Activity      Alcohol use: Yes        Comment: occassionally      Drug use: No      Sexual activity: Not on file       Review of Systems:  GENERAL: feels genera appearing fracture or dislocation. Again noted is a widened symphysis pubis with leftward distraction of the left pubic rami an a chronic fracture fragment within the diastatic space. Findings are grossly stable.   Extensive sacroiliitis  changes of the r therapy for modalities. We also discussed the possibility of some of the symptoms being from SI joint if the above treatments are not effective then I could send him to see a pain specialist for SI joint injection. Patient is amenable this plan of care.

## 2021-08-24 NOTE — PATIENT INSTRUCTIONS
Purchase over-the-counter Voltaren gel and apply topically to your right hip at the location of pain as instructed on the box.

## 2021-10-13 ENCOUNTER — LAB ENCOUNTER (OUTPATIENT)
Dept: LAB | Age: 71
End: 2021-10-13
Attending: FAMILY MEDICINE
Payer: MEDICARE

## 2021-10-13 DIAGNOSIS — E78.2 MIXED HYPERLIPIDEMIA: ICD-10-CM

## 2021-10-13 DIAGNOSIS — Z12.5 PROSTATE CANCER SCREENING: ICD-10-CM

## 2021-10-13 PROCEDURE — 36415 COLL VENOUS BLD VENIPUNCTURE: CPT

## 2021-10-13 PROCEDURE — 84443 ASSAY THYROID STIM HORMONE: CPT

## 2021-10-13 PROCEDURE — 80061 LIPID PANEL: CPT

## 2021-10-13 PROCEDURE — 80053 COMPREHEN METABOLIC PANEL: CPT

## 2021-10-22 ENCOUNTER — OFFICE VISIT (OUTPATIENT)
Dept: FAMILY MEDICINE CLINIC | Facility: CLINIC | Age: 71
End: 2021-10-22
Payer: MEDICARE

## 2021-10-22 VITALS
DIASTOLIC BLOOD PRESSURE: 87 MMHG | WEIGHT: 201.38 LBS | HEART RATE: 85 BPM | BODY MASS INDEX: 29.83 KG/M2 | SYSTOLIC BLOOD PRESSURE: 133 MMHG | HEIGHT: 69 IN

## 2021-10-22 DIAGNOSIS — I10 ESSENTIAL HYPERTENSION: ICD-10-CM

## 2021-10-22 DIAGNOSIS — Z00.00 ENCOUNTER FOR ANNUAL HEALTH EXAMINATION: ICD-10-CM

## 2021-10-22 DIAGNOSIS — Z00.00 MEDICARE ANNUAL WELLNESS VISIT, SUBSEQUENT: Primary | ICD-10-CM

## 2021-10-22 DIAGNOSIS — E78.2 MIXED HYPERLIPIDEMIA: ICD-10-CM

## 2021-10-22 PROCEDURE — G0439 PPPS, SUBSEQ VISIT: HCPCS | Performed by: FAMILY MEDICINE

## 2021-10-22 RX ORDER — ASPIRIN 81 MG/1
81 TABLET ORAL DAILY
Qty: 90 TABLET | Refills: 3 | Status: SHIPPED | OUTPATIENT
Start: 2021-10-22

## 2021-10-22 NOTE — PROGRESS NOTES
Cognitive Assessment     What day of the week is this?: Correct  What month is it?: Correct  What year is it?: Correct  Recall \"Ball\": Correct   Recall \"Flag\": Correct   Recall \"Tree\": Correct

## 2021-10-22 NOTE — PATIENT INSTRUCTIONS
Joyce Tavera's SCREENING SCHEDULE   Tests on this list are recommended by your physician but may not be covered, or covered at this frequency, by your insurer. Please check with your insurance carrier before scheduling to verify coverage.    PREVENTATIVE vaccine ([id number] & [id number]) covered once after 65 Prevnar 13: 10/18/2017    [id number]: 06/05/2019     No recommendations at this time    Hepatitis B One screening covered for patients with certain risk factors   -  No recommendations at this time

## 2021-10-22 NOTE — PROGRESS NOTES
HPI:   Sharlotte Boxer is a 73year old male who presents for a Medicare Subsequent Annual Wellness visit (Pt already had Initial Annual Wellness). Annual Physical due on 10/22/2022        He has been screened for Falls and is low risk.     Cognitive Asses 10/13/2021    TRIG 109 10/13/2021          Last Chemistry Labs:   Lab Results   Component Value Date    AST 20 10/13/2021    ALT 28 10/13/2021    CA 9.6 10/13/2021    ALB 3.8 10/13/2021    TSH 2.270 10/13/2021    CREATSERUM 0.85 10/13/2021    GLU 98 10/13/ 29.74 kg/m²   Estimated body mass index is 29.74 kg/m² as calculated from the following:    Height as of this encounter: 5' 9\" (1.753 m). Weight as of this encounter: 201 lb 6 oz (91.3 kg).     Medicare Hearing Assessment  (Required for AWV/SWV)    Hear Date(s) Administered   • Covid-19 Vaccine Pfizer 30 mcg/0.3 ml 03/23/2021, 04/14/2021   • FLU VAC High Dose 65 YRS & Older PRSV Free (07529) 10/10/2018, 10/11/2019, 10/06/2021   • Fluvirin, 3 Years & >, Im 10/11/2013, 10/22/2014   • Fluzone Vaccine [facility] FREQUENCY &  COVERAGE DETAILS LAST COMPLETION DATE   Diabetes Screening    Fasting Blood Sugar / Glucose    One screening every 12 months if never tested or if previously tested but not diagnosed with pre-diabetes   One screening every 6 months if Tetanus Toxoid Not covered by Medicare Part B unless medically necessary (cut with metal); may be covered with your pharmacy prescription benefits -    Tetanus, Diptheria and Pertusis TD and TDaP Not covered by Medicare Part B -  No recommendations at this

## 2021-10-26 ENCOUNTER — LAB ENCOUNTER (OUTPATIENT)
Dept: LAB | Age: 71
End: 2021-10-26
Attending: FAMILY MEDICINE
Payer: MEDICARE

## 2021-10-26 DIAGNOSIS — Z00.00 ROUTINE GENERAL MEDICAL EXAMINATION AT A HEALTH CARE FACILITY: Primary | ICD-10-CM

## 2021-10-26 PROCEDURE — 93010 ELECTROCARDIOGRAM REPORT: CPT | Performed by: FAMILY MEDICINE

## 2021-10-26 PROCEDURE — 93005 ELECTROCARDIOGRAM TRACING: CPT

## 2022-01-18 ENCOUNTER — OFFICE VISIT (OUTPATIENT)
Dept: PHYSICAL THERAPY | Age: 72
End: 2022-01-18
Attending: ORTHOPAEDIC SURGERY
Payer: MEDICARE

## 2022-01-18 DIAGNOSIS — S70.01XS CONTUSION OF RIGHT HIP, SEQUELA: ICD-10-CM

## 2022-01-18 DIAGNOSIS — M46.1 OSTEOARTHRITIS OF RIGHT SACROILIAC JOINT (HCC): ICD-10-CM

## 2022-01-18 PROCEDURE — 97162 PT EVAL MOD COMPLEX 30 MIN: CPT

## 2022-01-18 PROCEDURE — 97110 THERAPEUTIC EXERCISES: CPT

## 2022-01-18 NOTE — PROGRESS NOTES
SPINE EVALUATION:   Referring Physician: Dr. Pleasant Essex  Diagnosis: Santose Right med tendonopathy   Date of Service: 1/18/2022     PATIENT SUMMARY   Jovany Woodward is a 73year old male who presents to therapy today with complaints of hip/low back pain.  X-rays clear on R.   Signs and symptoms are consistent with diagnosis of glute med tendonopathy. Pt and PT discussed evaluation findings, pathology, POC and HEP. Pt voiced understanding and performs HEP correctly without reported pain.  Skilled Physical Therapy is medi weeks Billyie Plank will demo 5/5 hip extension strength in order to stand from a low surface. In 4 weeks Clemaryie Plank will be able to workout for 30 minutes without increasing pain in order to increase tolerance to activity.   In 4 weeks Aminah Plank be able to stand without in

## 2022-01-20 ENCOUNTER — OFFICE VISIT (OUTPATIENT)
Dept: PHYSICAL THERAPY | Age: 72
End: 2022-01-20
Attending: ORTHOPAEDIC SURGERY
Payer: MEDICARE

## 2022-01-20 PROCEDURE — 97110 THERAPEUTIC EXERCISES: CPT

## 2022-01-20 PROCEDURE — 97140 MANUAL THERAPY 1/> REGIONS: CPT

## 2022-01-20 NOTE — PROGRESS NOTES
Dx: Mandi Herrera med tendonopathy         Insurance (Authorized # of Visits):  Medicare A+B (8)           Authorizing Physician: Dr. Melani Gregory MD visit: none scheduled  Fall Risk: standard         Precautions: n/a             Subjective: Did the HEP and felt Treatment Time: 50 min

## 2022-01-24 RX ORDER — ATORVASTATIN CALCIUM 20 MG/1
TABLET, FILM COATED ORAL
Qty: 90 TABLET | Refills: 1 | Status: SHIPPED | OUTPATIENT
Start: 2022-01-24 | End: 2022-07-21

## 2022-01-24 RX ORDER — LOSARTAN POTASSIUM 100 MG/1
TABLET ORAL
Qty: 90 TABLET | Refills: 1 | Status: SHIPPED | OUTPATIENT
Start: 2022-01-24 | End: 2022-07-21

## 2022-01-24 NOTE — TELEPHONE ENCOUNTER
Refill passed per [facility] protocol.   Requested Prescriptions   Pending Prescriptions Disp Refills    ATORVASTATIN 20 MG Oral Tab [Pharmacy Med Name: ATORVASTATIN 20 MG TABLET] 90 tablet 1     Sig: TAKE 1 TABLET BY MOUTH EVERY DAY AT NIGHT        Cholesterol Medication Protocol Passed - 1/24/2022 12:51 PM        Passed - ALT in past 12 months        Passed - LDL in past 12 months        Passed - Last ALT < 80       Lab Results   Component Value Date    ALT 28 10/13/2021             Passed - Last LDL < 130     Lab Results   Component Value Date    LDL 75 10/13/2021               Passed - Appointment in past 12 or next 3 months           LOSARTAN 100 MG Oral Tab [Pharmacy Med Name: LOSARTAN POTASSIUM 100 MG TAB] 90 tablet 1     Sig: TAKE 1 TABLET BY MOUTH EVERY DAY        Hypertensive Medications Protocol Passed - 1/24/2022 12:51 PM        Passed - CMP or BMP in past 12 months        Passed - Appointment in past 6 or next 3 months        Passed - GFR Non- > 50     Lab Results   Component Value Date    GFRNAA 89 10/13/2021                        Recent Outpatient Visits              4 days ago     [address] Syd[facility]red    Office Visit    6 days ago Contusion of right hip, sequela    [address] Syd[facility] Allred    Office Visit    3 months ago Medicare annual wellness visit, subsequent    Jimi Solomon DO    Office Visit    5 months ago Contusion of right hip, sequela    [facility] Harry MD    Office Visit    5 months ago Right hip pain    Jimi Solomon DO    Office Visit            Future Appointments         Provider Department Appt Notes    Tomorrow Rosa Herrera, [address] Medicare /Supp    In 3 days Willaim Mow, [address] [employer] /Supp    In 1 week Willaim Mow, [address] [employer] /Supp    In 1 week Willaim Mow, [address] [employer] /Supp    In 2 weeks Willaim Mow, [address] [employer] /Supp    In 2 weeks Willaim Mow, [address] [employer] Maggie Campanile

## 2022-01-25 ENCOUNTER — OFFICE VISIT (OUTPATIENT)
Dept: PHYSICAL THERAPY | Age: 72
End: 2022-01-25
Attending: ORTHOPAEDIC SURGERY
Payer: MEDICARE

## 2022-01-25 PROCEDURE — 97110 THERAPEUTIC EXERCISES: CPT

## 2022-01-25 NOTE — PROGRESS NOTES
Dx: Boubacar Amaya med tendonopathy         Insurance (Authorized # of Visits):  Medicare A+B (8)           Authorizing Physician: Dr. Tyler Hutchins  Next MD visit: none scheduled  Fall Risk: standard         Precautions: n/a             Subjective: Did HEP the last few pain  paloff press with leg tap back 2x15                    HEP: GTB sidestepping, SL airex abduction    Charges: 3 therex       Total Timed Treatment: 45 min  Total Treatment Time: 45 min

## 2022-01-27 ENCOUNTER — OFFICE VISIT (OUTPATIENT)
Dept: PHYSICAL THERAPY | Age: 72
End: 2022-01-27
Attending: ORTHOPAEDIC SURGERY
Payer: MEDICARE

## 2022-01-27 PROCEDURE — 97110 THERAPEUTIC EXERCISES: CPT

## 2022-01-27 NOTE — PROGRESS NOTES
Dx: Verneita Habermann med tendonopathy         Insurance (Authorized # of Visits):  Medicare A+B (8)           Authorizing Physician: Dr. Vianney Henry  Next MD visit: none scheduled  Fall Risk: standard         Precautions: n/a             Subjective:  Increased pain the ni hold  Lateral step down 7 in RLE x15  Lateral step down 7 in 10# db x15  STS GTB knees 11# med ball 2x15  SL RDL x15  Abduction wall isometrics - increase back pain  paloff press with leg tap back 2x15 Therex:  Education on proper form, frequency of exerci

## 2022-02-01 ENCOUNTER — OFFICE VISIT (OUTPATIENT)
Dept: PHYSICAL THERAPY | Age: 72
End: 2022-02-01
Attending: ORTHOPAEDIC SURGERY
Payer: MEDICARE

## 2022-02-01 PROCEDURE — 97110 THERAPEUTIC EXERCISES: CPT

## 2022-02-01 PROCEDURE — 97140 MANUAL THERAPY 1/> REGIONS: CPT

## 2022-02-03 ENCOUNTER — APPOINTMENT (OUTPATIENT)
Dept: PHYSICAL THERAPY | Age: 72
End: 2022-02-03
Attending: ORTHOPAEDIC SURGERY
Payer: MEDICARE

## 2022-02-08 ENCOUNTER — APPOINTMENT (OUTPATIENT)
Dept: PHYSICAL THERAPY | Age: 72
End: 2022-02-08
Attending: ORTHOPAEDIC SURGERY
Payer: MEDICARE

## 2022-02-10 ENCOUNTER — APPOINTMENT (OUTPATIENT)
Dept: PHYSICAL THERAPY | Age: 72
End: 2022-02-10
Attending: ORTHOPAEDIC SURGERY
Payer: MEDICARE

## 2022-07-20 ENCOUNTER — TELEPHONE (OUTPATIENT)
Dept: FAMILY MEDICINE CLINIC | Facility: CLINIC | Age: 72
End: 2022-07-20

## 2022-07-21 RX ORDER — ATORVASTATIN CALCIUM 20 MG/1
TABLET, FILM COATED ORAL
Qty: 90 TABLET | Refills: 0 | Status: SHIPPED | OUTPATIENT
Start: 2022-07-21 | End: 2022-10-21

## 2022-07-21 RX ORDER — LOSARTAN POTASSIUM 100 MG/1
100 TABLET ORAL DAILY
Qty: 90 TABLET | Refills: 0 | Status: SHIPPED | OUTPATIENT
Start: 2022-07-21 | End: 2022-10-21

## 2022-07-26 ENCOUNTER — LAB ENCOUNTER (OUTPATIENT)
Dept: LAB | Age: 72
End: 2022-07-26
Attending: FAMILY MEDICINE
Payer: MEDICARE

## 2022-07-26 DIAGNOSIS — E78.2 MIXED HYPERLIPIDEMIA: ICD-10-CM

## 2022-07-26 LAB
ALBUMIN SERPL-MCNC: 3.7 G/DL (ref 3.4–5)
ALBUMIN/GLOB SERPL: 1.1 {RATIO} (ref 1–2)
ALP LIVER SERPL-CCNC: 79 U/L
ALT SERPL-CCNC: 22 U/L
ANION GAP SERPL CALC-SCNC: 8 MMOL/L (ref 0–18)
AST SERPL-CCNC: 17 U/L (ref 15–37)
BILIRUB SERPL-MCNC: 0.5 MG/DL (ref 0.1–2)
BUN BLD-MCNC: 12 MG/DL (ref 7–18)
BUN/CREAT SERPL: 13.2 (ref 10–20)
CALCIUM BLD-MCNC: 9.7 MG/DL (ref 8.5–10.1)
CHLORIDE SERPL-SCNC: 106 MMOL/L (ref 98–112)
CHOLEST SERPL-MCNC: 145 MG/DL (ref ?–200)
CO2 SERPL-SCNC: 26 MMOL/L (ref 21–32)
CREAT BLD-MCNC: 0.91 MG/DL
FASTING PATIENT LIPID ANSWER: YES
FASTING STATUS PATIENT QL REPORTED: YES
GLOBULIN PLAS-MCNC: 3.3 G/DL (ref 2.8–4.4)
GLUCOSE BLD-MCNC: 103 MG/DL (ref 70–99)
HDLC SERPL-MCNC: 60 MG/DL (ref 40–59)
LDLC SERPL CALC-MCNC: 62 MG/DL (ref ?–100)
NONHDLC SERPL-MCNC: 85 MG/DL (ref ?–130)
OSMOLALITY SERPL CALC.SUM OF ELEC: 290 MOSM/KG (ref 275–295)
POTASSIUM SERPL-SCNC: 4.3 MMOL/L (ref 3.5–5.1)
PROT SERPL-MCNC: 7 G/DL (ref 6.4–8.2)
SODIUM SERPL-SCNC: 140 MMOL/L (ref 136–145)
TRIGL SERPL-MCNC: 134 MG/DL (ref 30–149)
VLDLC SERPL CALC-MCNC: 20 MG/DL (ref 0–30)

## 2022-07-26 PROCEDURE — 80053 COMPREHEN METABOLIC PANEL: CPT

## 2022-07-26 PROCEDURE — 80061 LIPID PANEL: CPT

## 2022-07-26 PROCEDURE — 36415 COLL VENOUS BLD VENIPUNCTURE: CPT

## 2022-07-28 ENCOUNTER — OFFICE VISIT (OUTPATIENT)
Dept: FAMILY MEDICINE CLINIC | Facility: CLINIC | Age: 72
End: 2022-07-28
Payer: MEDICARE

## 2022-07-28 VITALS
SYSTOLIC BLOOD PRESSURE: 138 MMHG | HEART RATE: 91 BPM | BODY MASS INDEX: 30.09 KG/M2 | WEIGHT: 203.13 LBS | HEIGHT: 69 IN | DIASTOLIC BLOOD PRESSURE: 70 MMHG

## 2022-07-28 DIAGNOSIS — I10 ESSENTIAL HYPERTENSION: ICD-10-CM

## 2022-07-28 DIAGNOSIS — E78.2 MIXED HYPERLIPIDEMIA: Primary | ICD-10-CM

## 2022-07-28 PROCEDURE — 1126F AMNT PAIN NOTED NONE PRSNT: CPT | Performed by: FAMILY MEDICINE

## 2022-07-28 PROCEDURE — 99213 OFFICE O/P EST LOW 20 MIN: CPT | Performed by: FAMILY MEDICINE

## 2022-07-28 NOTE — PROGRESS NOTES
Blood pressure 138/70, pulse 91, height 5' 9\" (1.753 m), weight 203 lb 1.6 oz (92.1 kg). Patient presents today following up for hypertension hyperlipidemia denies chest pain or dyspnea.     Objective patient is comfortable no apparent distress    Assessment hypertension hyperlipidemia    Plan continue medications follow-up for Medicare annual physical    Discussed shingles vaccine

## 2022-10-21 RX ORDER — LOSARTAN POTASSIUM 100 MG/1
100 TABLET ORAL DAILY
Qty: 90 TABLET | Refills: 1 | Status: SHIPPED | OUTPATIENT
Start: 2022-10-21

## 2022-10-21 RX ORDER — ATORVASTATIN CALCIUM 20 MG/1
TABLET, FILM COATED ORAL
Qty: 90 TABLET | Refills: 1 | Status: SHIPPED | OUTPATIENT
Start: 2022-10-21

## 2022-10-21 NOTE — TELEPHONE ENCOUNTER
Refill passed per [employer] protocol.     Requested Prescriptions   Pending Prescriptions Disp Refills    LOSARTAN 100 MG Oral Tab [Pharmacy Med Name: LOSARTAN POTASSIUM 100 MG TAB] 90 tablet 0     Sig: TAKE 1 TABLET BY MOUTH EVERY DAY        Hypertensive Medications Protocol Passed - 10/21/2022  1:44 AM        Passed - In person appointment in the past 12 or next 3 months       Recent Outpatient Visits              2 months ago Mixed hyperlipidemia    Ernesto Solomon DO    Office Visit    8 months ago     [address] [place] [place]    Office Visit    8 months ago     [address] Veronica [place]    Office Visit    8 months ago     [address] Veronica [place], [place]    Office Visit    9 months ago     [address] Veronica [place], [place]    Office Visit                 Passed - Last BP reading less than 140/90     BP Readings from Last 1 Encounters:  07/28/22 : 138/70                Passed - CMP or BMP in past 6 months     Recent Results (from the past 4392 hour(s))   COMP METABOLIC PANEL (14)    Collection Time: 07/26/22 10:42 AM   Result Value Ref Range    Glucose 103 (H) 70 - 99 mg/dL    Sodium 140 136 - 145 mmol/L    Potassium 4.3 3.5 - 5.1 mmol/L    Chloride 106 98 - 112 mmol/L    CO2 26.0 21.0 - 32.0 mmol/L    Anion Gap 8 0 - 18 mmol/L    BUN 12 7 - 18 mg/dL    Creatinine 0.91 0.70 - 1.30 mg/dL    BUN/CREA Ratio 13.2 10.0 - 20.0    Calcium, Total 9.7 8.5 - 10.1 mg/dL    Calculated Osmolality 290 275 - 295 mOsm/kg    GFR, Non- 86 >=60    GFR, -American 99 >=60    ALT 22 16 - 61 U/L    AST 17 15 - 37 U/L    Alkaline Phosphatase 79 45 - 117 U/L    Bilirubin, Total 0.5 0.1 - 2.0 mg/dL    Total Protein 7.0 6.4 - 8.2 g/dL    Albumin 3.7 3.4 - 5.0 g/dL    Globulin  3.3 2.8 - 4.4 g/dL    A/G Ratio 1.1 1.0 - 2.0    Patient Fasting for CMP? Yes      *Note: Due to a large number of results and/or encounters for the requested time period, some results have not been displayed. A complete set of results can be found in Results Review.                  Passed - In person appointment or virtual visit in the past 6 months       Recent Outpatient Visits              2 months ago Mixed hyperlipidemia    [facility], [address], [place]    Office Visit    8 months ago     [facility] [place]    Office Visit    8 months ago     [facility] [place]    Office Visit    8 months ago     [facility] [place]    Office Visit    9 months ago     [facility], [place]    Office Visit                 Passed - EGFRCR or GFRNAA > 50     GFR Evaluation  GFRNAA: 86 , resulted on 7/26/2022               ATORVASTATIN 20 MG Oral Tab [Pharmacy Med Name: ATORVASTATIN 20 MG TABLET] 90 tablet 0     Sig: TAKE 1 TABLET BY MOUTH EVERY DAY AT NIGHT        Cholesterol Medication Protocol Passed - 10/21/2022  1:44 AM        Passed - ALT in past 12 months        Passed - LDL in past 12 months        Passed - Last ALT < 80       Lab Results   Component Value Date    ALT 22 07/26/2022             Passed - Last LDL < 130     Lab Results   Component Value Date    LDL 62 07/26/2022               Passed - In person appointment or virtual visit in the past 12 mos or appointment in next 3 mos       Recent Outpatient Visits              2 months ago Mixed hyperlipidemia    [facility], [address], [place]    Office Visit    8 months ago     [facility] [employer] [facility], [address] [place] [address] Visit    8 months ago     [facility] [address] [facility] [place], [place]    Office Visit    8 months ago     [address] [address] [facility]    Office Visit    9 months ago     [address] [address] [address]    Office Visit                        Recent Outpatient Visits              2 months ago Mixed hyperlipidemia    [facility], [address],     Office Visit    8 months ago     [address] [address] [address]    Office Visit    8 months ago     [address] [address] [address]    Office Visit    8 months ago     [address] [address] [address] [place]    Office Visit    9 months ago     [facility] [address] [facility] [place], [place]    Office Visit

## 2023-01-24 ENCOUNTER — LAB ENCOUNTER (OUTPATIENT)
Dept: LAB | Age: 73
End: 2023-01-24
Attending: FAMILY MEDICINE
Payer: MEDICARE

## 2023-01-24 DIAGNOSIS — Z12.5 PROSTATE CANCER SCREENING: ICD-10-CM

## 2023-01-24 DIAGNOSIS — I10 ESSENTIAL HYPERTENSION: ICD-10-CM

## 2023-01-24 LAB
ALBUMIN SERPL-MCNC: 3.9 G/DL (ref 3.4–5)
ALBUMIN/GLOB SERPL: 1.2 {RATIO} (ref 1–2)
ALP LIVER SERPL-CCNC: 84 U/L
ALT SERPL-CCNC: 28 U/L
ANION GAP SERPL CALC-SCNC: 8 MMOL/L (ref 0–18)
AST SERPL-CCNC: 19 U/L (ref 15–37)
BASOPHILS # BLD AUTO: 0.06 X10(3) UL (ref 0–0.2)
BASOPHILS NFR BLD AUTO: 1.3 %
BILIRUB SERPL-MCNC: 0.5 MG/DL (ref 0.1–2)
BUN BLD-MCNC: 15 MG/DL (ref 7–18)
BUN/CREAT SERPL: 14 (ref 10–20)
CALCIUM BLD-MCNC: 9.6 MG/DL (ref 8.5–10.1)
CHLORIDE SERPL-SCNC: 105 MMOL/L (ref 98–112)
CHOLEST SERPL-MCNC: 154 MG/DL (ref ?–200)
CO2 SERPL-SCNC: 27 MMOL/L (ref 21–32)
COMPLEXED PSA SERPL-MCNC: 3.81 NG/ML (ref ?–4)
CREAT BLD-MCNC: 1.07 MG/DL
DEPRECATED RDW RBC AUTO: 44.7 FL (ref 35.1–46.3)
EOSINOPHIL # BLD AUTO: 0.11 X10(3) UL (ref 0–0.7)
EOSINOPHIL NFR BLD AUTO: 2.4 %
ERYTHROCYTE [DISTWIDTH] IN BLOOD BY AUTOMATED COUNT: 13.6 % (ref 11–15)
FASTING PATIENT LIPID ANSWER: YES
FASTING STATUS PATIENT QL REPORTED: YES
GFR SERPLBLD BASED ON 1.73 SQ M-ARVRAT: 75 ML/MIN/1.73M2 (ref 60–?)
GLOBULIN PLAS-MCNC: 3.3 G/DL (ref 2.8–4.4)
GLUCOSE BLD-MCNC: 98 MG/DL (ref 70–99)
HCT VFR BLD AUTO: 44.3 %
HDLC SERPL-MCNC: 66 MG/DL (ref 40–59)
HGB BLD-MCNC: 14.8 G/DL
IMM GRANULOCYTES # BLD AUTO: 0.01 X10(3) UL (ref 0–1)
IMM GRANULOCYTES NFR BLD: 0.2 %
LDLC SERPL CALC-MCNC: 60 MG/DL (ref ?–100)
LYMPHOCYTES # BLD AUTO: 1.68 X10(3) UL (ref 1–4)
LYMPHOCYTES NFR BLD AUTO: 36.6 %
MCH RBC QN AUTO: 29.8 PG (ref 26–34)
MCHC RBC AUTO-ENTMCNC: 33.4 G/DL (ref 31–37)
MCV RBC AUTO: 89.3 FL
MONOCYTES # BLD AUTO: 0.53 X10(3) UL (ref 0.1–1)
MONOCYTES NFR BLD AUTO: 11.5 %
NEUTROPHILS # BLD AUTO: 2.2 X10 (3) UL (ref 1.5–7.7)
NEUTROPHILS # BLD AUTO: 2.2 X10(3) UL (ref 1.5–7.7)
NEUTROPHILS NFR BLD AUTO: 48 %
NONHDLC SERPL-MCNC: 88 MG/DL (ref ?–130)
OSMOLALITY SERPL CALC.SUM OF ELEC: 291 MOSM/KG (ref 275–295)
PLATELET # BLD AUTO: 273 10(3)UL (ref 150–450)
POTASSIUM SERPL-SCNC: 4.6 MMOL/L (ref 3.5–5.1)
PROT SERPL-MCNC: 7.2 G/DL (ref 6.4–8.2)
RBC # BLD AUTO: 4.96 X10(6)UL
SODIUM SERPL-SCNC: 140 MMOL/L (ref 136–145)
TRIGL SERPL-MCNC: 169 MG/DL (ref 30–149)
TSI SER-ACNC: 1.83 MIU/ML (ref 0.36–3.74)
VLDLC SERPL CALC-MCNC: 25 MG/DL (ref 0–30)
WBC # BLD AUTO: 4.6 X10(3) UL (ref 4–11)

## 2023-01-24 PROCEDURE — 85025 COMPLETE CBC W/AUTO DIFF WBC: CPT

## 2023-01-24 PROCEDURE — 80061 LIPID PANEL: CPT

## 2023-01-24 PROCEDURE — 80053 COMPREHEN METABOLIC PANEL: CPT

## 2023-01-24 PROCEDURE — 36415 COLL VENOUS BLD VENIPUNCTURE: CPT

## 2023-01-24 PROCEDURE — 84443 ASSAY THYROID STIM HORMONE: CPT

## 2023-01-31 ENCOUNTER — EKG ENCOUNTER (OUTPATIENT)
Dept: LAB | Age: 73
End: 2023-01-31
Attending: FAMILY MEDICINE
Payer: MEDICARE

## 2023-01-31 ENCOUNTER — OFFICE VISIT (OUTPATIENT)
Dept: FAMILY MEDICINE CLINIC | Facility: CLINIC | Age: 73
End: 2023-01-31

## 2023-01-31 VITALS
HEART RATE: 90 BPM | BODY MASS INDEX: 30.81 KG/M2 | DIASTOLIC BLOOD PRESSURE: 96 MMHG | HEIGHT: 69 IN | SYSTOLIC BLOOD PRESSURE: 171 MMHG | WEIGHT: 208 LBS

## 2023-01-31 DIAGNOSIS — M46.1 OSTEOARTHRITIS OF RIGHT SACROILIAC JOINT (HCC): ICD-10-CM

## 2023-01-31 DIAGNOSIS — Z00.00 MEDICARE ANNUAL WELLNESS VISIT, SUBSEQUENT: ICD-10-CM

## 2023-01-31 DIAGNOSIS — Z00.00 MEDICARE ANNUAL WELLNESS VISIT, SUBSEQUENT: Primary | ICD-10-CM

## 2023-01-31 DIAGNOSIS — R05.8 OTHER COUGH: ICD-10-CM

## 2023-01-31 DIAGNOSIS — R39.12 WEAK URINE STREAM: ICD-10-CM

## 2023-01-31 DIAGNOSIS — E78.2 MIXED HYPERLIPIDEMIA: ICD-10-CM

## 2023-01-31 DIAGNOSIS — I10 ESSENTIAL HYPERTENSION: ICD-10-CM

## 2023-01-31 DIAGNOSIS — Z00.00 ENCOUNTER FOR ANNUAL HEALTH EXAMINATION: ICD-10-CM

## 2023-01-31 LAB
ATRIAL RATE: 79 BPM
P AXIS: 60 DEGREES
P-R INTERVAL: 192 MS
Q-T INTERVAL: 354 MS
QRS DURATION: 76 MS
QTC CALCULATION (BEZET): 405 MS
R AXIS: -24 DEGREES
T AXIS: 36 DEGREES
VENTRICULAR RATE: 79 BPM

## 2023-01-31 PROCEDURE — 93010 ELECTROCARDIOGRAM REPORT: CPT | Performed by: INTERNAL MEDICINE

## 2023-01-31 PROCEDURE — 93005 ELECTROCARDIOGRAM TRACING: CPT

## 2023-01-31 PROCEDURE — 1125F AMNT PAIN NOTED PAIN PRSNT: CPT | Performed by: FAMILY MEDICINE

## 2023-01-31 PROCEDURE — G0439 PPPS, SUBSEQ VISIT: HCPCS | Performed by: FAMILY MEDICINE

## 2023-01-31 PROCEDURE — 99213 OFFICE O/P EST LOW 20 MIN: CPT | Performed by: FAMILY MEDICINE

## 2023-01-31 RX ORDER — TAMSULOSIN HYDROCHLORIDE 0.4 MG/1
0.4 CAPSULE ORAL NIGHTLY
Qty: 30 CAPSULE | Refills: 2 | Status: SHIPPED | OUTPATIENT
Start: 2023-01-31 | End: 2023-03-02

## 2023-01-31 RX ORDER — CODEINE PHOSPHATE AND GUAIFENESIN 10; 100 MG/5ML; MG/5ML
5 SOLUTION ORAL EVERY 6 HOURS PRN
Qty: 240 ML | Refills: 0 | Status: SHIPPED | OUTPATIENT
Start: 2023-01-31

## 2023-02-14 ENCOUNTER — OFFICE VISIT (OUTPATIENT)
Dept: FAMILY MEDICINE CLINIC | Facility: CLINIC | Age: 73
End: 2023-02-14

## 2023-02-14 VITALS — DIASTOLIC BLOOD PRESSURE: 80 MMHG | HEIGHT: 69 IN | SYSTOLIC BLOOD PRESSURE: 170 MMHG | BODY MASS INDEX: 31 KG/M2

## 2023-02-14 DIAGNOSIS — I10 ESSENTIAL HYPERTENSION: Primary | ICD-10-CM

## 2023-02-14 PROCEDURE — 1126F AMNT PAIN NOTED NONE PRSNT: CPT | Performed by: FAMILY MEDICINE

## 2023-02-14 PROCEDURE — 99213 OFFICE O/P EST LOW 20 MIN: CPT | Performed by: FAMILY MEDICINE

## 2023-02-14 RX ORDER — LOSARTAN POTASSIUM AND HYDROCHLOROTHIAZIDE 12.5; 1 MG/1; MG/1
1 TABLET ORAL DAILY
Qty: 30 TABLET | Refills: 0 | Status: SHIPPED | OUTPATIENT
Start: 2023-02-14

## 2023-02-14 NOTE — PROGRESS NOTES
Height 5' 9\" (1.753 m).     Blood pressure 170/80    Following up for hypertension    Assessment hypertension uncontrolled    Plan discontinue losartan start losartan/hydrochlorothiazide follow-up in 3 weeks

## 2023-03-07 ENCOUNTER — OFFICE VISIT (OUTPATIENT)
Dept: FAMILY MEDICINE CLINIC | Facility: CLINIC | Age: 73
End: 2023-03-07

## 2023-03-07 VITALS
DIASTOLIC BLOOD PRESSURE: 78 MMHG | SYSTOLIC BLOOD PRESSURE: 120 MMHG | WEIGHT: 207 LBS | HEIGHT: 69 IN | BODY MASS INDEX: 30.66 KG/M2

## 2023-03-07 DIAGNOSIS — I10 ESSENTIAL HYPERTENSION: Primary | ICD-10-CM

## 2023-03-07 PROCEDURE — 99213 OFFICE O/P EST LOW 20 MIN: CPT | Performed by: FAMILY MEDICINE

## 2023-03-07 PROCEDURE — 1126F AMNT PAIN NOTED NONE PRSNT: CPT | Performed by: FAMILY MEDICINE

## 2023-03-07 RX ORDER — MOMETASONE FUROATE 1 MG/G
1 CREAM TOPICAL 2 TIMES DAILY PRN
Qty: 50 G | Refills: 0 | Status: SHIPPED | OUTPATIENT
Start: 2023-03-07

## 2023-03-07 NOTE — PROGRESS NOTES
Blood pressure 120/78, height 5' 9\" (1.753 m), weight 207 lb (93.9 kg). Patient presents today following up for hypertension feels well. Noticed a skin lesion on his right chest area that just popped up for 3 weeks. He otherwise feels well.     Objective large erythematous irregular plaque noted right upper thorax area    Assessment #1 uncontrolled hypertension #2 atypical mole    Plan #1 continue present medications #2 follow-up after use of Elocon for 2 to 3 weeks we will determine if removal is appropriate

## 2023-04-12 ENCOUNTER — TELEPHONE (OUTPATIENT)
Dept: FAMILY MEDICINE CLINIC | Facility: CLINIC | Age: 73
End: 2023-04-12

## 2023-04-12 NOTE — TELEPHONE ENCOUNTER
Condition update:  Patient called (identified name and ),   States Dr Jaspal Sanders started him on losartan-hydrochlorothiazide 100-12.5, but he stopped taking it 1.5 weeks ago because it made him feel tired and winded after his exercise program, felt weaker. Since stopping it and going back to losartan 100 mg he feels fine, normal energy level. He has not been checking blood pressure at home (stated \"I'd rather have high blood pressure than feel the way the losartan-hydrochlorothiazide made me feel\"), but was advised to start checking blood pressure and send an update through [employer] or by calling. FYI Dr Jaspal Sanders.

## 2023-04-13 DIAGNOSIS — E78.2 MIXED HYPERLIPIDEMIA: Primary | ICD-10-CM

## 2023-04-13 RX ORDER — LOSARTAN POTASSIUM 100 MG/1
100 TABLET ORAL DAILY
Qty: 30 TABLET | Refills: 0 | COMMUNITY
Start: 2023-04-13

## 2023-04-13 RX ORDER — LOSARTAN POTASSIUM 100 MG/1
TABLET ORAL
Qty: 90 TABLET | Refills: 1 | OUTPATIENT
Start: 2023-04-13

## 2023-04-13 RX ORDER — ATORVASTATIN CALCIUM 20 MG/1
TABLET, FILM COATED ORAL
Qty: 90 TABLET | Refills: 3 | Status: SHIPPED | OUTPATIENT
Start: 2023-04-13

## 2023-04-28 ENCOUNTER — OFFICE VISIT (OUTPATIENT)
Dept: FAMILY MEDICINE CLINIC | Facility: CLINIC | Age: 73
End: 2023-04-28

## 2023-04-28 VITALS
BODY MASS INDEX: 30.36 KG/M2 | HEIGHT: 69 IN | WEIGHT: 205 LBS | SYSTOLIC BLOOD PRESSURE: 128 MMHG | HEART RATE: 81 BPM | DIASTOLIC BLOOD PRESSURE: 68 MMHG

## 2023-04-28 DIAGNOSIS — K63.5 POLYP OF COLON, UNSPECIFIED PART OF COLON, UNSPECIFIED TYPE: Primary | ICD-10-CM

## 2023-04-28 PROCEDURE — 99213 OFFICE O/P EST LOW 20 MIN: CPT | Performed by: FAMILY MEDICINE

## 2023-04-28 PROCEDURE — 1126F AMNT PAIN NOTED NONE PRSNT: CPT | Performed by: FAMILY MEDICINE

## 2023-04-28 NOTE — PROGRESS NOTES
Blood pressure 128/68, pulse 81, height 5' 9\" (1.753 m), weight 205 lb (93 kg). Presents today following up elevated home blood pressure readings. Did not like the way he felt on the losartan/hydrochlorothiazide    Objective patient comfortable no apparent distress    Assessment elevated blood pressure readings at home    Plan advised patient to get his blood [place] [facility]    Continue losartan 100 mg daily    Reiterated that he is to have the calcium score done also colonoscopy he will decide if he wants to do it.

## 2023-05-03 ENCOUNTER — OFFICE VISIT (OUTPATIENT)
Dept: SURGERY | Facility: CLINIC | Age: 73
End: 2023-05-03

## 2023-05-03 VITALS — DIASTOLIC BLOOD PRESSURE: 85 MMHG | SYSTOLIC BLOOD PRESSURE: 121 MMHG | HEART RATE: 90 BPM

## 2023-05-03 DIAGNOSIS — Q54.9 HYPOSPADIAS IN MALE: ICD-10-CM

## 2023-05-03 DIAGNOSIS — N40.1 BPH WITH OBSTRUCTION/LOWER URINARY TRACT SYMPTOMS: Primary | ICD-10-CM

## 2023-05-03 DIAGNOSIS — R82.90 URINE FINDING: ICD-10-CM

## 2023-05-03 DIAGNOSIS — Z12.5 PROSTATE CANCER SCREENING: ICD-10-CM

## 2023-05-03 DIAGNOSIS — N13.8 BPH WITH OBSTRUCTION/LOWER URINARY TRACT SYMPTOMS: Primary | ICD-10-CM

## 2023-05-03 LAB
APPEARANCE: CLEAR
BILIRUBIN: NEGATIVE
GLUCOSE (URINE DIPSTICK): NEGATIVE MG/DL
KETONES (URINE DIPSTICK): NEGATIVE MG/DL
LEUKOCYTES: NEGATIVE
MULTISTIX LOT#: NORMAL NUMERIC
NITRITE, URINE: NEGATIVE
OCCULT BLOOD: NEGATIVE
PH, URINE: 6.5 (ref 4.5–8)
SPECIFIC GRAVITY: 1.01 (ref 1–1.03)
URINE-COLOR: YELLOW
UROBILINOGEN,SEMI-QN: 0.2 MG/DL (ref 0–1.9)

## 2023-05-03 PROCEDURE — 81002 URINALYSIS NONAUTO W/O SCOPE: CPT | Performed by: UROLOGY

## 2023-05-03 PROCEDURE — 99204 OFFICE O/P NEW MOD 45 MIN: CPT | Performed by: UROLOGY

## 2023-05-03 PROCEDURE — 1126F AMNT PAIN NOTED NONE PRSNT: CPT | Performed by: UROLOGY

## 2023-05-03 PROCEDURE — 51798 US URINE CAPACITY MEASURE: CPT | Performed by: UROLOGY

## 2023-06-01 RX ORDER — LOSARTAN POTASSIUM 100 MG/1
TABLET ORAL
Qty: 90 TABLET | Refills: 3 | Status: SHIPPED | OUTPATIENT
Start: 2023-06-01

## 2023-06-01 RX ORDER — TAMSULOSIN HYDROCHLORIDE 0.4 MG/1
CAPSULE ORAL
Qty: 90 CAPSULE | Refills: 3 | OUTPATIENT
Start: 2023-06-01

## 2023-06-01 NOTE — TELEPHONE ENCOUNTER
Spoke with pt,  verified. Pt req rx ref for losartan 100 mg every day, per med list it was historical. .   Pt stated he is no longer taking tamsulosin, Rx denied and sent to pharm. pls advise, thanks in advance. Refill passed per [facility] protocol   Requested Prescriptions   Pending Prescriptions Disp Refills    TAMSULOSIN 0.4 MG Oral Cap [Pharmacy Med Name: TAMSULOSIN HCL 0.4 MG CAPSULE] 90 capsule 0     Sig: TAKE 1 CAPSULE BY MOUTH EVERYDAY AT BEDTIME       Genitourinary Medications Passed - 2023 12:27 PM        Passed - Patient does not have pulmonary hypertension on problem list        Passed - In person appointment or virtual visit in the past 12 mos or appointment in next 3 mos     Recent Outpatient Visits              4 weeks ago BPH with obstruction/lower urinary tract symptoms    [facility], [address], Semaj Grewal MD    Office Visit    1 month ago Polyp of colon, unspecified part of colon, unspecified type    [facility], [address], [place] Bear, DO    Office Visit    2 months ago Essential hypertension    [facility], [address], [place] Ann Hooker, DO    Office Visit    3 months ago Essential hypertension    [facility], [address], [place] Bear, DO    Office Visit    4 months ago [employer] annual wellness visit, subsequent    [facility], [address] [place] [address], [place] Hooker, DO    Office Visit          Future Appointments         Provider Department Appt Notes    In 5 days [address] 49.00 FEE.                  LOSARTAN 100 MG Oral Tab [Pharmacy Med Name: LOSARTAN POTASSIUM 100 MG TAB] 90 tablet 1     Sig: TAKE 1 TABLET BY MOUTH EVERY DAY       Hypertensive Medications Protocol Passed - 2023 12:27 PM        Passed - In person appointment in the past 12 or next 3 months     Recent Outpatient Visits              4 weeks ago BPH with obstruction/lower urinary tract symptoms    [facility], [address], Saturnino aVsques MD    Office Visit    1 month ago Polyp of colon, unspecified part of colon, unspecified type    [facility], [facility] [place], [place], Sinda Simpers, DO    Office Visit    2 months ago Essential hypertension    [facility], [facility] [place], [place], Sinda Simpers, DO    Office Visit    3 months ago Essential hypertension    [facility], [facility], [place], [place], Sinda Simpers, DO    Office Visit    4 months ago [employer] annual wellness visit, subsequent    [address], [address] [facility], [place], Sinda Simpers, DO    Office Visit          Future Appointments         Provider Department Appt Notes    In 5 days [address]00 FEE. Passed - Last BP reading less than 140/90     BP Readings from Last 1 Encounters:  05/03/23 : 121/85                Passed - CMP or BMP in past 6 months     Recent Results (from the past 4392 hour(s))   COMP METABOLIC PANEL (14)    Collection Time: 01/24/23 11:34 AM   Result Value Ref Range    Glucose 98 70 - 99 mg/dL    Sodium 140 136 - 145 mmol/L    Potassium 4.6 3.5 - 5.1 mmol/L    Chloride 105 98 - 112 mmol/L    CO2 27.0 21.0 - 32.0 mmol/L    Anion Gap 8 0 - 18 mmol/L    BUN 15 7 - 18 mg/dL    Creatinine 1.07 0.70 - 1.30 mg/dL    BUN/CREA Ratio 14.0 10.0 - 20.0    Calcium, Total 9.6 8.5 - 10.1 mg/dL    Calculated Osmolality 291 275 - 295 mOsm/kg    eGFR-Cr 75 >=60 mL/min/1.73m2    ALT 28 16 - 61 U/L    AST 19 15 - 37 U/L    Alkaline Phosphatase 84 45 - 117 U/L    Bilirubin, Total 0.5 0.1 - 2.0 mg/dL    Total Protein 7.2 6.4 - 8.2 g/dL    Albumin 3.9 3.4 - 5.0 g/dL    Globulin  3.3 2.8 - 4.4 g/dL    A/G Ratio 1.2 1.0 - 2.0    Patient Fasting for CMP?  Yes      *Note: Due to a large number of results and/or encounters for the requested time period, some results have not been displayed. A complete set of results can be found in Results Review. Passed - In person appointment or virtual visit in the past 6 months     Recent Outpatient Visits              4 weeks ago BPH with obstruction/lower urinary tract symptoms    [facility], [address], Sofia Miller MD    Office Visit    1 month ago Polyp of colon, unspecified part of colon, unspecified type    [facility], [place], DO    Office Visit    2 months ago Essential hypertension    [facility], [address], [place], DO    Office Visit    3 months ago Essential hypertension    [facility], [place], DO    Office Visit    4 months ago [employer] annual wellness visit, subsequent    [facility], [address] [facility], [place] [facility]    Office Visit          Future Appointments         Provider Department Appt Notes    In 5 days [address]00 FEE.                Passed - EGFRCR or GFRNAA > 50     GFR Evaluation  EGFRCR: 75 , resulted on 1/24/2023

## 2023-06-06 ENCOUNTER — HOSPITAL ENCOUNTER (OUTPATIENT)
Dept: CT IMAGING | Age: 73
Discharge: HOME | End: 2023-06-06
Attending: FAMILY MEDICINE

## 2023-06-06 DIAGNOSIS — E78.2 MIXED HYPERLIPIDEMIA: ICD-10-CM

## 2023-06-06 DIAGNOSIS — I10 ESSENTIAL HYPERTENSION: ICD-10-CM

## 2023-06-13 ENCOUNTER — TELEPHONE (OUTPATIENT)
Dept: FAMILY MEDICINE CLINIC | Facility: CLINIC | Age: 73
End: 2023-06-13

## 2023-06-13 ENCOUNTER — HOSPITAL ENCOUNTER (OUTPATIENT)
Dept: CT IMAGING | Age: 73
Discharge: HOME | End: 2023-06-13
Attending: FAMILY MEDICINE
Payer: MEDICARE

## 2023-06-13 DIAGNOSIS — R91.1 LUNG NODULE: ICD-10-CM

## 2023-06-13 LAB
CREAT BLD-MCNC: 0.9 MG/DL
GFR SERPLBLD BASED ON 1.73 SQ M-ARVRAT: 92 ML/MIN/1.73M2 (ref 60–?)

## 2023-06-13 PROCEDURE — 82565 ASSAY OF CREATININE: CPT

## 2023-06-13 PROCEDURE — 71260 CT THORAX DX C+: CPT | Performed by: FAMILY MEDICINE

## 2023-06-13 NOTE — TELEPHONE ENCOUNTER
Patient in office for CT calcium score results,  out of office until 06/19, provided patient with cariology information as providers note below, patient requesting call back to discuss results. Radha Hayden PA-C   6/12/2023 10:47 PM CDT       CT calcium score is 787. It is consistent with extensive atherosclerotic plaque.  Refer to Cardiologist for further evaluation

## 2023-06-14 NOTE — TELEPHONE ENCOUNTER
Left message to call back. Please transfer to triage. 2nd attempt. Not sure if pt still has questions. See messages below.

## 2023-06-15 NOTE — TELEPHONE ENCOUNTER
Pt called us back and he was inform of min message below on his test results. Pt verbalized understanding and already has a appt with the cardiologist Dr. Gurpreet Russo [facility],for 7/12/2023. Pt had no further questions.

## 2023-06-16 ENCOUNTER — TELEPHONE (OUTPATIENT)
Dept: FAMILY MEDICINE CLINIC | Facility: CLINIC | Age: 73
End: 2023-06-16

## 2023-06-16 NOTE — TELEPHONE ENCOUNTER
Fidencio Roy PA-C  [facility]  CT chest is stable . Repeat in 1 year    -Left a brief VM regarding results.

## 2023-08-24 RX ORDER — TAMSULOSIN HYDROCHLORIDE 0.4 MG/1
0.4 CAPSULE ORAL NIGHTLY
Qty: 90 CAPSULE | Refills: 0 | OUTPATIENT
Start: 2023-08-24

## 2023-08-25 ENCOUNTER — TELEPHONE (OUTPATIENT)
Facility: CLINIC | Age: 73
End: 2023-08-25

## 2023-08-25 NOTE — TELEPHONE ENCOUNTER
----- Message from Mely Mcghee, [address] sent at 10/25/2018  9:31 AM CDT -----  Regarding: Recall Colon  Recall colon for 5 years per PL.  Colon done 10/23/18

## 2023-09-21 ENCOUNTER — TELEPHONE (OUTPATIENT)
Dept: FAMILY MEDICINE CLINIC | Facility: CLINIC | Age: 73
End: 2023-09-21

## 2023-09-21 NOTE — TELEPHONE ENCOUNTER
Left message to pt to call back.        Future Appointments   Date Time Provider Sirena Carmona   10/31/2023  9:15 AM [facility], PROCEDURE [id number] None

## 2023-09-21 NOTE — TELEPHONE ENCOUNTER
Patient called and stated he wanted to stay on a blood pressure medication that he has been taking recently as it has been working for him. He then stated that he will go back to taking another one he had because he has a brand new bottle.  He confused me with what he wanted to do and he is also requesting a phone call back from a nurse

## 2023-09-22 RX ORDER — AMLODIPINE AND OLMESARTAN MEDOXOMIL 5; 40 MG/1; MG/1
1 TABLET ORAL DAILY
COMMUNITY

## 2023-09-22 NOTE — TELEPHONE ENCOUNTER
Spoke to patient. He said that he has been taking Amlodipine Olmesartan 5/40 ordered by cardiology but they only gave him a 30 day supply. They sent a different medication but it was not at the pharmacy when he went to pick it up. He said that he has been taking his blood pressure later in the day and it has been fine. When he was checking it first thing in the morning, it was elevated. He has a bottle of Losartan and is going to take that, check blood pressures and then update [facility]. RN suggested he call [facility] so everyone is on the same page and so they are aware of what medication he is taking. He verbalized understanding.

## 2023-10-24 ENCOUNTER — TELEPHONE (OUTPATIENT)
Dept: FAMILY MEDICINE CLINIC | Facility: CLINIC | Age: 73
End: 2023-10-24

## 2023-10-27 NOTE — PAT NURSING NOTE
Location, date and time verified with patient and wife who verbalized understanding. All questions answered at this time.

## 2023-10-31 ENCOUNTER — ANESTHESIA EVENT (OUTPATIENT)
Dept: ENDOSCOPY | Age: 73
End: 2023-10-31
Payer: MEDICARE

## 2023-10-31 ENCOUNTER — ANESTHESIA (OUTPATIENT)
Dept: ENDOSCOPY | Age: 73
End: 2023-10-31
Payer: MEDICARE

## 2023-10-31 ENCOUNTER — HOSPITAL ENCOUNTER (OUTPATIENT)
Age: 73
Setting detail: HOSPITAL OUTPATIENT SURGERY
Discharge: HOME | End: 2023-10-31
Attending: INTERNAL MEDICINE | Admitting: INTERNAL MEDICINE
Payer: MEDICARE

## 2023-10-31 VITALS
HEART RATE: 71 BPM | OXYGEN SATURATION: 98 % | SYSTOLIC BLOOD PRESSURE: 145 MMHG | TEMPERATURE: 98 F | HEIGHT: 69 IN | DIASTOLIC BLOOD PRESSURE: 77 MMHG | RESPIRATION RATE: 21 BRPM | BODY MASS INDEX: 30.36 KG/M2 | WEIGHT: 205 LBS

## 2023-10-31 DIAGNOSIS — Z86.010 HISTORY OF COLON POLYPS: ICD-10-CM

## 2023-10-31 DIAGNOSIS — Z12.11 COLON CANCER SCREENING: ICD-10-CM

## 2023-10-31 PROCEDURE — 88305 TISSUE EXAM BY PATHOLOGIST: CPT | Performed by: INTERNAL MEDICINE

## 2023-10-31 PROCEDURE — 99070 SPECIAL SUPPLIES PHYS/QHP: CPT | Performed by: INTERNAL MEDICINE

## 2023-10-31 PROCEDURE — 45380 COLONOSCOPY AND BIOPSY: CPT | Performed by: INTERNAL MEDICINE

## 2023-10-31 RX ORDER — SODIUM CHLORIDE, SODIUM LACTATE, POTASSIUM CHLORIDE, CALCIUM CHLORIDE 600; 310; 30; 20 MG/100ML; MG/100ML; MG/100ML; MG/100ML
INJECTION, SOLUTION INTRAVENOUS CONTINUOUS
Status: DISCONTINUED | OUTPATIENT
Start: 2023-10-31 | End: 2023-10-31

## 2023-10-31 RX ORDER — NALOXONE HYDROCHLORIDE 0.4 MG/ML
80 INJECTION, SOLUTION INTRAMUSCULAR; INTRAVENOUS; SUBCUTANEOUS AS NEEDED
Status: DISCONTINUED | OUTPATIENT
Start: 2023-10-31 | End: 2023-10-31

## 2023-10-31 RX ORDER — LIDOCAINE HYDROCHLORIDE 10 MG/ML
INJECTION, SOLUTION EPIDURAL; INFILTRATION; INTRACAUDAL; PERINEURAL AS NEEDED
Status: DISCONTINUED | OUTPATIENT
Start: 2023-10-31 | End: 2023-10-31 | Stop reason: SURG

## 2023-10-31 RX ADMIN — SODIUM CHLORIDE, SODIUM LACTATE, POTASSIUM CHLORIDE, CALCIUM CHLORIDE: 600; 310; 30; 20 INJECTION, SOLUTION INTRAVENOUS at 09:16:00

## 2023-10-31 RX ADMIN — LIDOCAINE HYDROCHLORIDE 50 MG: 10 INJECTION, SOLUTION EPIDURAL; INFILTRATION; INTRACAUDAL; PERINEURAL at 09:16:00

## 2023-10-31 NOTE — H&P
History & Physical Examination    Patient Name: [facility]  MRN: [id number]  CSN: [id number]  YOB: 1950    Diagnosis:  Hx colon polyps   CRC screening      amLODIPine-Olmesartan 5-40 MG Oral Tab, Take 1 tablet by mouth daily. , Disp: , Rfl: , 10/31/2023  PEG 3350-KCl-Na Bicarb-NaCl (TRILYTE) 420 g Oral Recon Soln, Take prep as directed by gastro office. May substitute with Trilyte/generic equivalent if needed. , Disp: 1 each, Rfl: 0, 10/31/2023  Vitamin C 500 MG Oral Tab, Take 1 tablet (500 mg total) by mouth daily. , Disp: , Rfl: , 10/31/2023  cholecalciferol 50 MCG (2000 UT) Oral Cap, Take 1 capsule (2,000 Units total) by mouth daily. , Disp: , Rfl: , 10/31/2023  B Complex Vitamins (VITAMIN B COMPLEX OR), Take by mouth., Disp: , Rfl: , 10/31/2023  omega-3 fatty acids 1000 MG Oral Cap, Take 1,000 mg by mouth daily. , Disp: , Rfl: , 10/31/2023  NON FORMULARY, Take by mouth daily. Stress formula vitamin B, Disp: , Rfl: , 10/31/2023  Multiple Vitamins-Minerals (CENTRUM SPECIALIST HEART) Oral Tab, Take by mouth., Disp: , Rfl: , 10/31/2023  multivitamin Oral Tab, Take 1 tablet by mouth daily with breakfast. Lutein and zeaxanthin, Disp: , Rfl: , 10/31/2023  LOSARTAN 100 MG Oral Tab, TAKE 1 TABLET BY MOUTH EVERY DAY, Disp: 90 tablet, Rfl: 3, 10/31/2023  atorvastatin 20 MG Oral Tab, TAKE 1 TABLET BY MOUTH EVERY DAY AT NIGHT, Disp: 90 tablet, Rfl: 3, 10/31/2023  Mometasone Furoate 0.1 % External Cream, Apply 1 Application. topically 2 (two) times daily as needed. , Disp: 50 g, Rfl: 0, 10/31/2023  aspirin (ECOTRIN LOW STRENGTH) 81 MG Oral Tab EC, Take 1 tablet (81 mg total) by mouth daily. , Disp: 90 tablet, Rfl: 3, 10/31/2023      lactated ringers infusion, , Intravenous, Continuous        Allergies: No Known Allergies    Past Medical History:   Diagnosis Date    Essential hypertension     High blood pressure     High cholesterol     Hyperlipidemia     Leg fracture, left     HARDWARE PLACEMENT/REMOVAL     Past Surgical History:   Procedure Laterality Date    COLONOSCOPY  2015    COLONOSCOPY N/A 10/23/2018    Procedure: COLONOSCOPY;  Surgeon: Justyn Howell MD;  [facility]: [facility] ENDO     Family History   Problem Relation Age of Onset    Breast Cancer Mother     Diabetes Paternal Grandfather     Other (Other[other]) Father      Social History    Tobacco Use      Smoking status: Never      Smokeless tobacco: Never    Alcohol use: Yes      Comment: occassionally      SYSTEM Check if Review is Normal Check if Physical Exam is Normal If not normal, please explain:   HEENT [x ] [ x]    NECK & BACK [x ] [x ]    HEART [x ] [ x]    LUNGS [x ] [ x]    ABDOMEN [x ] [x ]    UROGENITAL [ ] [ ]    EXTREMITIES [x ] [x ]    OTHER        [ x ] I have discussed the risks and benefits and alternatives with the patient/family. They understand and agree to proceed with plan of care. [ x ] I have reviewed the History and Physical done within the last 30 days. Any changes noted above. Blas Ghosh.  Debra Devine MD  10/31/2023  8:55 AM

## 2023-10-31 NOTE — OPERATIVE REPORT
[facility] Endoscopy Report  Date of procedure-October 31, 2023    Preoperative Diagnosis:  -History of colon polyps  -Colorectal cancer screening      Postoperative Diagnosis:  -Colon polyp x1  -Diverticular disease  -Small internal hemorrhoids      Procedure:    Colonoscopy       Surgeon:  Karishma Moore M.D. Anesthesia:  MAC     Technique:  After informed consent, the patient was placed in the left lateral recumbent position. Digital rectal examination revealed no palpable intraluminal abnormalities. An Olympus variable stiffness 190 series HD colonoscope was inserted into the rectum and advanced under direct vision by following the lumen to the cecum. The colon was examined upon withdrawal in the left lateral position. The procedure was well tolerated without immediate complication. Findings:  The preparation of the colon was good. The terminal ileum was examined for 4 cm and visually normal.  The ileocecal valve was well preserved. The visualized colonic mucosa from the cecum to the anal verge was normal with an intact vascular pattern. Descending colon polyp x1, this was diminutive removed by cold forceps technique. No bleeding was noted at the polypectomy site and specimens retrieved and sent for analysis. Diverticulosis located in the left colon, no diverticulitis. Small internal hemorrhoids noted on retroflexed view. Estimated blood loss-insignificant  Specimens-see above    Impression:  -Colon polyp x1  -Diverticular disease  -Small internal hemorrhoids    Recommendations:  - Post polypectomy instructions given  - Repeat colonoscopy in 5 years  - High fiber diet for diverticular disease  - Symptomatic treatment of hemorrhoids          Martin Ramsay.  Marleen Vieira MD  10/31/2023  9:39 AM

## 2023-10-31 NOTE — DISCHARGE INSTRUCTIONS

## 2023-11-06 ENCOUNTER — TELEPHONE (OUTPATIENT)
Facility: CLINIC | Age: 73
End: 2023-11-06

## 2023-11-06 NOTE — TELEPHONE ENCOUNTER
----- Message from Jeane Carver MD sent at 11/6/2023  2:51 PM CST -----  I wanted to get back to you with your colonoscopy results. You had one colon polyp removed which was benign. I would advise a repeat colonoscopy in 5 years to make sure no new polyps are forming. You also have internal hemorrhoids and diverticulosis. Please stay on a high fiber diet and call with any questions.

## 2023-11-13 ENCOUNTER — MED REC SCAN ONLY (OUTPATIENT)
Facility: CLINIC | Age: 73
End: 2023-11-13

## 2024-02-20 ENCOUNTER — OFFICE VISIT (OUTPATIENT)
Dept: WOUND CARE | Facility: HOSPITAL | Age: 74
End: 2024-02-20
Attending: STUDENT IN AN ORGANIZED HEALTH CARE EDUCATION/TRAINING PROGRAM
Payer: MEDICARE

## 2024-02-20 VITALS
BODY MASS INDEX: 30.21 KG/M2 | SYSTOLIC BLOOD PRESSURE: 142 MMHG | DIASTOLIC BLOOD PRESSURE: 87 MMHG | RESPIRATION RATE: 18 BRPM | OXYGEN SATURATION: 97 % | HEART RATE: 84 BPM | TEMPERATURE: 97 F | HEIGHT: 69 IN | WEIGHT: 204 LBS

## 2024-02-20 DIAGNOSIS — L84 CALLUS OF FOOT: Primary | ICD-10-CM

## 2024-02-20 PROCEDURE — 99204 OFFICE O/P NEW MOD 45 MIN: CPT | Performed by: STUDENT IN AN ORGANIZED HEALTH CARE EDUCATION/TRAINING PROGRAM

## 2024-02-20 NOTE — PROGRESS NOTES
Subjective   Kang Tavera is a 73 year old male.    Chief Complaint   Patient presents with    Wound Care     Lt heel       HPI    Patient is a pleasant 73-year-old male who presents to clinic for evaluation of a callus on the left posterior heel.  Admits to an he had a wound at that location in 1986 and it was surgically debrided removing a lot of scar tissue.  He admits that since the surgery he has had pain in the area with callus formation.  Patient has seen other podiatrist which have told him that it is callus.  He does admit to debriding it at home sometimes himself to provide temporary relief.      Review of Systems  Denies nausea, vomiting, fever, chills, shortness of breath, chest pain, and calf pain.    Objective    Physical Exam  Derm: Refer to wound assessment .  No acute signs of infection noted to left foot.  Vascular: 2/4 DP and PT pedal pulses  Neuro: Protective sensation intact to bilateral feet  Musculoskeletal: Tenderness with palpation at the left calcaneal callus site.      Wound Assessment  Wound 02/20/24 1 Heel Left (Active)   Date First Assessed/Time First Assessed: 02/20/24 0939    Wound Number (Wound Clinic Only): 1  Location: Heel  Wound Location Orientation: Left      Assessments 2/20/2024  9:44 AM   Wound Image     Site Assessment Dry;Yellow   Closure Approximated   Drainage Amount None   Treatments Cleansed;Wound ;Topical (Barrier/Moisturizer/Ointment)   Dressing Bandaid   Dressing Changed New   Dressing Status Clean;Dry;Intact   Wound Length (cm) 0 cm   Wound Width (cm) 0 cm   Wound Surface Area (cm^2) 0 cm^2   Wound Depth (cm) 0 cm   Wound Volume (cm^3) 0 cm^3   Margins Undefined edges   Wound Bed Epithelium (%) 100 %   State of Healing Hyperkeratosis;Epithelialized;Closed wound edges   Wound Odor None       No associated orders.          Vital Signs  Vital Signs    02/20/24 0938   BP: 142/87   Pulse: 84   Resp: 18   Temp: 97.2 °F (36.2 °C)   PainSc: 5 - (Moderate)   PainLoc:  Foot         Allergies  No Known Allergies      Assessment    Encounter Diagnosis  1. Callus of foot        Problem List  Patient Active Problem List   Diagnosis    Lumbago    Sebaceous cyst    Colon polyps    Mixed hyperlipidemia    Essential hypertension    Eustachian tube disorder    Welcome to Medicare preventive visit    Dermatitis    Prostate cancer screening    Osteoarthritis of right sacroiliac joint (HCC)       Plan    Patient seen and examined and findings discussed with patient.  Using a sterile #15 blade a lot of the callus was removed to healthy skin base without incident.  The site was dressed with mupirocin ointment and a Band-Aid.  Informed patient that it is a callus and there is no surgical treatment that I would recommend besides using a pumice stone carefully as needed at home for debridement.  Discussed possible cortisone injection in the area to help with some of the deep scar tissue formation.  Educated patient on acute signs of infection and instructed him to seek immediate medical attention if symptoms arise.  Advised patient to use a moisturizer at the callus site to keep it soft.  Patient to follow-up with me as needed in the podiatry clinic.    Orders  No orders of the defined types were placed in this encounter.        Follow-Up  Return Discharged.

## 2024-02-27 ENCOUNTER — OFFICE VISIT (OUTPATIENT)
Dept: FAMILY MEDICINE CLINIC | Facility: CLINIC | Age: 74
End: 2024-02-27
Payer: MEDICARE

## 2024-02-27 VITALS
WEIGHT: 201 LBS | SYSTOLIC BLOOD PRESSURE: 123 MMHG | HEART RATE: 84 BPM | DIASTOLIC BLOOD PRESSURE: 76 MMHG | BODY MASS INDEX: 29.77 KG/M2 | HEIGHT: 69 IN

## 2024-02-27 DIAGNOSIS — Z00.00 MEDICARE ANNUAL WELLNESS VISIT, SUBSEQUENT: Primary | ICD-10-CM

## 2024-02-27 DIAGNOSIS — E78.2 MIXED HYPERLIPIDEMIA: ICD-10-CM

## 2024-02-27 DIAGNOSIS — I10 ESSENTIAL HYPERTENSION: ICD-10-CM

## 2024-02-27 DIAGNOSIS — Z12.5 PROSTATE CANCER SCREENING: ICD-10-CM

## 2024-02-27 DIAGNOSIS — K63.5 POLYP OF COLON, UNSPECIFIED PART OF COLON, UNSPECIFIED TYPE: ICD-10-CM

## 2024-02-27 PROCEDURE — 90677 PCV20 VACCINE IM: CPT | Performed by: FAMILY MEDICINE

## 2024-02-27 PROCEDURE — G0439 PPPS, SUBSEQ VISIT: HCPCS | Performed by: FAMILY MEDICINE

## 2024-02-27 PROCEDURE — G0009 ADMIN PNEUMOCOCCAL VACCINE: HCPCS | Performed by: FAMILY MEDICINE

## 2024-02-27 RX ORDER — AMLODIPINE BESYLATE 5 MG/1
5 TABLET ORAL DAILY
COMMUNITY
Start: 2024-02-27

## 2024-02-27 NOTE — PROGRESS NOTES
REASON FOR VISIT:    Kang Tavera is a 73 year old male who presents for an Annual Health Assessment.        Patient Active Problem List   Diagnosis    Lumbago    Sebaceous cyst    Colon polyps    Mixed hyperlipidemia    Essential hypertension    Eustachian tube disorder    Welcome to Medicare preventive visit    Dermatitis    Prostate cancer screening    Osteoarthritis of right sacroiliac joint (HCC)     General Health     How would you describe your current health state?: Good  Type of Diet: Balanced  How do you maintain positive mental well-being?: Social Interaction  How would you describe your daily physical activity?: Moderate  Have you had any immunizations at another office such as Influenza, Hepatitis B, Tetanus, or Pneumococcal?: No  At any time do you feel concerned for the safety/well-being of yourself and/or your children, in your home or elsewhere?: No     CAGE:     Cut: Have you ever felt you should Cut down on your drinking?: No  Annoyed: Have people Annoyed you by criticizing your drinking?: No  Guilty: Have you ever felt bad or Guilty about your drinking?: No  Eye Opener: Have you ever had a drink first thing in the morning to steady your nerves or to get rid of a hangover (Eye opener)?: No  Scoring  Total Score: 0     Depression Screening (PHQ-2/PHQ-9):  Over the LAST 2 WEEKS             PREVENTATIVE SERVICES  INDICATIONS AND SCHEDULE Recommendation Internal Lab or Procedure   Colonoscopy Screen Every 10 years Health Maintenance   Topic Date Due    Colorectal Cancer Screening  10/31/2028      Flex Sigmoidoscopy Screen  Every 5 years No results found for this or any previous visit.   Fecal Occult Blood  Annually No results found for: \"FOB\", \"OCCULTSTOOL\"   Obesity Screening Screen all adults annually Body mass index is 29.68 kg/m².     Preventive Services for Which Recommendations Vary with Risk Recommendation Internal Lab or Procedure   Cholesterol Screening Recommended screening varies with age,  risk and gender LDL Cholesterol (mg/dL)   Date Value   01/24/2023 60      Diabetes Screening  If history of high blood pressure or other  risk factors No results found for: \"A1C\"  Glucose (mg/dL)   Date Value   01/24/2023 98        Gonorrhea Screening If high risk No results found for: \"GONOCOCCUS\"   HIV Screening For all adults age 18-65, older adults at increased risk No results found for: \"HIV\"   Syphilis Screening Screen if pregnant or high risk No results found for: \"RPR\"   Hepatitis C Screening Screen pts at high risk plus screen one time for adults born 1945 - 1965 No results found for: \"HCVAB\"   Tuberculosis Screen If high risk No components found for: \"PPDINDURAT\"       SPECIFIC DISEASE MONITORING Internal Lab or Procedure     Annual Monitoring of Persistent Medications  (ACE/ARB, Digoxin, Diuretics)        Potassium  Annually Potassium (mmol/L)   Date Value   01/24/2023 4.6     POTASSIUM (P) (mmol/L)   Date Value   05/03/2016 4.8       Creatinine  Annually Creatinine (mg/dL)   Date Value   01/24/2023 1.07       Digoxin Serum Conc  Annually No results found for: \"DIGOXIN\"       ALLERGIES:   No Known Allergies  CURRENT MEDICATIONS:   Current Outpatient Medications   Medication Sig Dispense Refill    amLODIPine 5 MG Oral Tab Take 1 tablet (5 mg total) by mouth daily. For high blood pressure.      Vitamin C 500 MG Oral Tab Take 1 tablet (500 mg total) by mouth daily.      cholecalciferol 50 MCG (2000 UT) Oral Cap Take 1 capsule (2,000 Units total) by mouth daily.      B Complex Vitamins (VITAMIN B COMPLEX OR) Take by mouth.      omega-3 fatty acids 1000 MG Oral Cap Take 1,000 mg by mouth daily.      NON FORMULARY Take by mouth daily. Stress formula vitamin B      Multiple Vitamins-Minerals (CENTRUM SPECIALIST HEART) Oral Tab Take by mouth.      LOSARTAN 100 MG Oral Tab TAKE 1 TABLET BY MOUTH EVERY DAY 90 tablet 3    atorvastatin 20 MG Oral Tab TAKE 1 TABLET BY MOUTH EVERY DAY AT NIGHT 90 tablet 3    aspirin  (ECOTRIN LOW STRENGTH) 81 MG Oral Tab EC Take 1 tablet (81 mg total) by mouth daily. 90 tablet 3    Mometasone Furoate 0.1 % External Cream Apply 1 Application. topically 2 (two) times daily as needed. 50 g 0      MEDICAL INFORMATION:   Past Medical History:   Diagnosis Date    Essential hypertension     High blood pressure     High cholesterol     Hyperlipidemia     Leg fracture, left     HARDWARE PLACEMENT/REMOVAL      Past Surgical History:   Procedure Laterality Date    COLONOSCOPY  2015    COLONOSCOPY N/A 10/23/2018    Procedure: COLONOSCOPY;  Surgeon: Sang Manuel MD;  Location: [facility] ENDO    COLONOSCOPY N/A 10/31/2023    Procedure: COLONOSCOPY;  Surgeon: Sang Manuel MD;  Location: [facility] ENDO      Family History   Problem Relation Age of Onset    Breast Cancer Mother     Diabetes Paternal Grandfather     Other (Other[other]) Father      NO FAMILY HISTORY OF PROSTATE OR COLON CANCER     SOCIAL HISTORY:   Social History     Socioeconomic History    Marital status:    Occupational History    Occupation: Sales   Tobacco Use    Smoking status: Never    Smokeless tobacco: Never   Vaping Use    Vaping Use: Never used   Substance and Sexual Activity    Alcohol use: Yes     Comment: occassionally    Drug use: No   Other Topics Concern    Caffeine Concern No    Pt has a pacemaker No    Pt has a defibrillator No    Reaction to local anesthetic No     Occ:  :       REVIEW OF SYSTEMS:   GENERAL: feels well otherwise  SKIN: denies any unusual skin lesions  EYES: denies blurred vision or double vision  HEENT: denies nasal congestion, sinus pain or ST  LUNGS: denies shortness of breath with exertion  CARDIOVASCULAR: denies chest pain on exertion  GI: denies abdominal pain, denies heartburn  : denies nocturia or changes in stream  MUSCULOSKELETAL: denies back pain  NEURO: denies headaches  PSYCHE: denies depression or anxiety  HEMATOLOGIC: denies hx of anemia  ENDOCRINE: denies thyroid  history  ALL/ASTHMA: denies hx of allergy or asthma  NO BLOOD IN STOOL  EXAM:   /76 (BP Location: Left arm, Patient Position: Sitting)   Pulse 84   Ht 5' 9\" (1.753 m)   Wt 201 lb (91.2 kg)   BMI 29.68 kg/m²   >   BP Readings from Last 3 Encounters:   02/27/24 123/76   02/20/24 142/87   10/31/23 145/77        GENERAL: well developed, well nourished, in no apparent distress   SKIN: no rashes, no suspicious lesions  HEENT: atraumatic, normocephalic, ears and throat are clear  EYES:PERRLA, EOMI, conjunctiva are clear.    NECK: supple, no adenopathy, no bruits  CHEST: no chest tenderness  LUNGS: clear to auscultation  CARDIO: RRR without murmur  GI: good BS's, no masses, HSM or tenderness  : two descended testes, no masses, no hernia, no penile lesions  RECTAL: deferred  MUSCULOSKELETAL: back is not tender, FROM of the back  EXTREMITIES: no cyanosis, clubbing or edema  NEURO: Oriented times three, cranial nerves are intact, motor and sensory are grossly intact         ASSESSMENT AND OTHER RELEVANT CHRONIC CONDITIONS:   Kang Tavera is a 73 year old male who presents for an Annual Health Assessment.     PLAN SUMMARY:   Diagnoses and all orders for this visit:    Medicare annual wellness visit, subsequent  -     EKG 12 Lead; Future  -     OPHTHALMOLOGY - INTERNAL    Polyp of colon, unspecified part of colon, unspecified type    Mixed hyperlipidemia  -     Comp Metabolic Panel (14); Future  -     Lipid Panel; Future    Essential hypertension  -     TSH W Reflex To Free T4; Future    Prostate cancer screening  -     PSA Total, Screen; Future    Other orders  -     Prevnar 20 (PCV20) [[id number]]       The patient indicates understanding of these issues and agrees to the plan.  Return in about 3 weeks (around 3/19/2024) for 30 MINUTE APPOINTMENT MOLE  REMOVAL .  Exercise counseling perfomed    SUGGESTED VACCINATIONS - Influenza, Pneumococcal, Zoster, Tetanus, HPV   Influenza: No recommendations at this time  Pneumonia:  No recommendations at this time  HPV: No recommendations at this time  Tdap: No recommendations at this time  Shingles: Shingrix shingles vaccine is due    Influenza Annually   Pneumococcal if high risk   Td/Tdap once then every 10 years   HPV Males 11-21   Zoster (Shingles) 60 and older: one dose   Varicella 2 doses if not immune   MMR 1-2 doses if born after 1954 and not immune     1. Medicare annual wellness visit, subsequent  Prevnar vaccine  - EKG 12 Lead; Future  - OPHTHALMOLOGY - INTERNAL    2. Polyp of colon, unspecified part of colon, unspecified type      3. Mixed hyperlipidemia  Continue atorvastatin  - Comp Metabolic Panel (14); Future  - Lipid Panel; Future    4. Essential hypertension  Controlled on medication  - TSH W Reflex To Free T4; Future    5. Prostate cancer screening  Labs ordered  - PSA Total, Screen; Future      Patient with 2 bothersome moles on the back will follow-up for removal

## 2024-03-13 ENCOUNTER — TELEPHONE (OUTPATIENT)
Dept: PODIATRY CLINIC | Facility: CLINIC | Age: 74
End: 2024-03-13

## 2024-03-13 NOTE — TELEPHONE ENCOUNTER
Patient has been seen in wound clinic most recently on 2/20/24. Per note it appears that patient has been seen for left heel callus. In the bottom of the note, it states to follow up as needed in podiatry clinic. Patient states that pain persists and patient would like cortisone injection. I don't know if that was discussed. Would you like for us to book him in podiatry clinic for possible cortisone injection.    Please advise

## 2024-03-13 NOTE — TELEPHONE ENCOUNTER
VM full and was not able to leave message with mobile number    Called home number and booked patient for 3:30 appointment on 3/19/24. He accepted and had no further questions

## 2024-03-13 NOTE — TELEPHONE ENCOUNTER
Patient states that he continues to have a lot of pain in the left foot, so he wants to know if he can go ahead and schedule the cortisone injection?

## 2024-03-14 ENCOUNTER — LAB ENCOUNTER (OUTPATIENT)
Dept: LAB | Age: 74
End: 2024-03-14
Attending: FAMILY MEDICINE
Payer: MEDICARE

## 2024-03-14 DIAGNOSIS — E78.2 MIXED HYPERLIPIDEMIA: ICD-10-CM

## 2024-03-14 DIAGNOSIS — Z12.5 PROSTATE CANCER SCREENING: ICD-10-CM

## 2024-03-14 DIAGNOSIS — I10 ESSENTIAL HYPERTENSION: ICD-10-CM

## 2024-03-14 LAB
ALBUMIN SERPL-MCNC: 4.4 G/DL (ref 3.2–4.8)
ALBUMIN/GLOB SERPL: 1.7 {RATIO} (ref 1–2)
ALP LIVER SERPL-CCNC: 80 U/L
ALT SERPL-CCNC: 24 U/L
ANION GAP SERPL CALC-SCNC: 4 MMOL/L (ref 0–18)
AST SERPL-CCNC: 20 U/L (ref ?–34)
BILIRUB SERPL-MCNC: 0.7 MG/DL (ref 0.2–1.1)
BUN BLD-MCNC: 15 MG/DL (ref 9–23)
BUN/CREAT SERPL: 14.9 (ref 10–20)
CALCIUM BLD-MCNC: 10.1 MG/DL (ref 8.7–10.4)
CHLORIDE SERPL-SCNC: 106 MMOL/L (ref 98–112)
CHOLEST SERPL-MCNC: 156 MG/DL (ref ?–200)
CO2 SERPL-SCNC: 29 MMOL/L (ref 21–32)
COMPLEXED PSA SERPL-MCNC: 2.74 NG/ML (ref ?–4)
CREAT BLD-MCNC: 1.01 MG/DL
EGFRCR SERPLBLD CKD-EPI 2021: 80 ML/MIN/1.73M2 (ref 60–?)
FASTING PATIENT LIPID ANSWER: YES
FASTING STATUS PATIENT QL REPORTED: YES
GLOBULIN PLAS-MCNC: 2.6 G/DL (ref 2.8–4.4)
GLUCOSE BLD-MCNC: 87 MG/DL (ref 70–99)
HDLC SERPL-MCNC: 57 MG/DL (ref 40–59)
LDLC SERPL CALC-MCNC: 81 MG/DL (ref ?–100)
NONHDLC SERPL-MCNC: 99 MG/DL (ref ?–130)
OSMOLALITY SERPL CALC.SUM OF ELEC: 288 MOSM/KG (ref 275–295)
POTASSIUM SERPL-SCNC: 4.4 MMOL/L (ref 3.5–5.1)
PROT SERPL-MCNC: 7 G/DL (ref 5.7–8.2)
SODIUM SERPL-SCNC: 139 MMOL/L (ref 136–145)
TRIGL SERPL-MCNC: 98 MG/DL (ref 30–149)
TSI SER-ACNC: 1.99 MIU/ML (ref 0.55–4.78)
VLDLC SERPL CALC-MCNC: 15 MG/DL (ref 0–30)

## 2024-03-14 PROCEDURE — 84443 ASSAY THYROID STIM HORMONE: CPT

## 2024-03-14 PROCEDURE — 80061 LIPID PANEL: CPT

## 2024-03-14 PROCEDURE — 80053 COMPREHEN METABOLIC PANEL: CPT

## 2024-03-14 PROCEDURE — 36415 COLL VENOUS BLD VENIPUNCTURE: CPT

## 2024-03-15 ENCOUNTER — TELEPHONE (OUTPATIENT)
Dept: FAMILY MEDICINE CLINIC | Facility: CLINIC | Age: 74
End: 2024-03-15

## 2024-03-15 NOTE — TELEPHONE ENCOUNTER
----- Message from Sourav Boyle PA-C sent at 3/14/2024  9:55 PM CDT -----  Sugar, liver function, kidney function, cholesterol, PSA and thyroid are normal.

## 2024-03-15 NOTE — TELEPHONE ENCOUNTER
Called pt and couldn't leave message due to inbox full    Sent [employer] message regarding message below

## 2024-03-19 ENCOUNTER — OFFICE VISIT (OUTPATIENT)
Dept: PODIATRY CLINIC | Facility: CLINIC | Age: 74
End: 2024-03-19
Payer: MEDICARE

## 2024-03-19 DIAGNOSIS — L97.521 ULCER OF LEFT FOOT, LIMITED TO BREAKDOWN OF SKIN (HCC): Primary | ICD-10-CM

## 2024-03-19 PROCEDURE — 99213 OFFICE O/P EST LOW 20 MIN: CPT | Performed by: STUDENT IN AN ORGANIZED HEALTH CARE EDUCATION/TRAINING PROGRAM

## 2024-03-19 NOTE — PROGRESS NOTES
[facility] Podiatry  Progress Note      Kang Tavera is a 73 year old male.   Chief Complaint   Patient presents with    Heel Pain     Consult - L heel pain for a while - rates pain 11/10  -No numbness or tingling.             HPI:     Patient is a pleasant 73-year-old male who presents to clinic for evaluation of a painful callus on the left posterior heel.  Pt had a wound at that location in 1986 and it was surgically debrided removing a lot of scar tissue.  He relates  that since the surgery he has had pain in the area with callus formation.  He does admit to debriding it at home sometimes himself to provide temporary relief.    Allergies: Patient has no known allergies.    Current Outpatient Medications   Medication Sig Dispense Refill    amLODIPine 5 MG Oral Tab Take 1 tablet (5 mg total) by mouth daily. For high blood pressure.      Vitamin C 500 MG Oral Tab Take 1 tablet (500 mg total) by mouth daily.      cholecalciferol 50 MCG (2000 UT) Oral Cap Take 1 capsule (2,000 Units total) by mouth daily.      B Complex Vitamins (VITAMIN B COMPLEX OR) Take by mouth.      omega-3 fatty acids 1000 MG Oral Cap Take 1,000 mg by mouth daily.      NON FORMULARY Take by mouth daily. Stress formula vitamin B      Multiple Vitamins-Minerals (CENTRUM SPECIALIST HEART) Oral Tab Take by mouth.      LOSARTAN 100 MG Oral Tab TAKE 1 TABLET BY MOUTH EVERY DAY 90 tablet 3    atorvastatin 20 MG Oral Tab TAKE 1 TABLET BY MOUTH EVERY DAY AT NIGHT 90 tablet 3    Mometasone Furoate 0.1 % External Cream Apply 1 Application. topically 2 (two) times daily as needed. 50 g 0    aspirin (ECOTRIN LOW STRENGTH) 81 MG Oral Tab EC Take 1 tablet (81 mg total) by mouth daily. 90 tablet 3      Past Medical History:   Diagnosis Date    Essential hypertension     High blood pressure     High cholesterol     Hyperlipidemia     Leg fracture, left     HARDWARE PLACEMENT/REMOVAL      Past Surgical History:   Procedure Laterality Date    COLONOSCOPY   2015    COLONOSCOPY N/A 10/23/2018    Procedure: COLONOSCOPY;  Surgeon: Sang Manuel MD;  Location: [facility] ENDO    COLONOSCOPY N/A 10/31/2023    Procedure: COLONOSCOPY;  Surgeon: Sang Manuel MD;  Location: [facility]      Family History   Problem Relation Age of Onset    Breast Cancer Mother     Diabetes Paternal Grandfather     Other (Other[other]) Father       Social History     Socioeconomic History    Marital status:    Occupational History    Occupation: Sales   Tobacco Use    Smoking status: Never    Smokeless tobacco: Never   Vaping Use    Vaping Use: Never used   Substance and Sexual Activity    Alcohol use: Yes     Comment: occassionally    Drug use: No   Other Topics Concern    Caffeine Concern No    Pt has a pacemaker No    Pt has a defibrillator No    Reaction to local anesthetic No           REVIEW OF SYSTEMS:     Denies nause, fever, chills  No calf pain  Denies chest pain or SOB      EXAM:   There were no vitals taken for this visit.  GENERAL: well developed, well nourished, in no apparent distress  EXTREMITIES:   1. Integument: Normal skin temperature and turgor. HPK tissue with underlying superficial ulcer limited to skin breakdown with no acute signs of infection, left heel.  2. Vascular: Dorsalis pedis two out of four bilateral and posterior tibial pulses two out of   four bilateral, capillary refill normal.   3. Musculoskeletal: All muscle groups are graded 5 out of 5 in the foot and ankle. Pain with palpation to left posterior heel   4. Neurological: Normal sharp dull sensation; reflexes normal.             ASSESSMENT AND PLAN:   Diagnoses and all orders for this visit:    Ulcer of left foot, limited to breakdown of skin (HCC)        Plan:     Patient seen and examined and findings discussed with patient.  Pared hyperkeratotic lesion to left heel with sterile #15 blade to healthy skin with superficial skin ulceration noted.  The left heel was offloaded with felt pad and dressed  with bacitracin and a Band-Aid.  Prescription sent for mupirocin ointment and instructed patient to perform dressing changes every other day as instructed.  Educated patient on acute signs of infection and instructed him to seek immediate medical attention if symptoms arise.  Return to clinic in 2 weeks for reevaluation.      The patient indicates understanding of these issues and agrees to the plan.        Kavya Jiménez DPM

## 2024-03-26 ENCOUNTER — OFFICE VISIT (OUTPATIENT)
Dept: FAMILY MEDICINE CLINIC | Facility: CLINIC | Age: 74
End: 2024-03-26

## 2024-03-26 VITALS
WEIGHT: 200 LBS | BODY MASS INDEX: 29.62 KG/M2 | SYSTOLIC BLOOD PRESSURE: 119 MMHG | HEIGHT: 69 IN | HEART RATE: 84 BPM | DIASTOLIC BLOOD PRESSURE: 82 MMHG

## 2024-03-26 DIAGNOSIS — L21.9 SEBORRHEIC DERMATITIS: Primary | ICD-10-CM

## 2024-03-26 PROCEDURE — 99212 OFFICE O/P EST SF 10 MIN: CPT | Performed by: FAMILY MEDICINE

## 2024-03-26 RX ORDER — LOSARTAN POTASSIUM 50 MG/1
50 TABLET ORAL DAILY
COMMUNITY
Start: 2024-03-26

## 2024-03-28 NOTE — PROGRESS NOTES
Blood pressure 119/82, pulse 84, height 5' 9\" (1.753 m), weight 200 lb (90.7 kg).      Skin tag removal right flank.    Objective skin tag noted right flank    Assessment skin tag    Plan removal using iris scissors patient tolerated well good hemostasis

## 2024-04-02 ENCOUNTER — OFFICE VISIT (OUTPATIENT)
Dept: PODIATRY CLINIC | Facility: CLINIC | Age: 74
End: 2024-04-02

## 2024-04-02 VITALS — HEART RATE: 82 BPM | SYSTOLIC BLOOD PRESSURE: 115 MMHG | DIASTOLIC BLOOD PRESSURE: 79 MMHG

## 2024-04-02 DIAGNOSIS — M79.672 INTRACTABLE LEFT HEEL PAIN: Primary | ICD-10-CM

## 2024-04-02 PROCEDURE — 99214 OFFICE O/P EST MOD 30 MIN: CPT | Performed by: STUDENT IN AN ORGANIZED HEALTH CARE EDUCATION/TRAINING PROGRAM

## 2024-04-02 RX ORDER — TRIAMCINOLONE ACETONIDE 40 MG/ML
40 INJECTION, SUSPENSION INTRA-ARTICULAR; INTRAMUSCULAR ONCE
Status: COMPLETED | OUTPATIENT
Start: 2024-04-02 | End: 2024-04-02

## 2024-04-02 RX ORDER — DEXAMETHASONE SODIUM PHOSPHATE 4 MG/ML
4 VIAL (ML) INJECTION ONCE
Status: COMPLETED | OUTPATIENT
Start: 2024-04-02 | End: 2024-04-02

## 2024-04-02 RX ADMIN — TRIAMCINOLONE ACETONIDE 40 MG: 40 INJECTION, SUSPENSION INTRA-ARTICULAR; INTRAMUSCULAR at 14:15:00

## 2024-04-02 RX ADMIN — DEXAMETHASONE SODIUM PHOSPHATE 4 MG: 4 MG/ML VIAL (ML) INJECTION at 14:15:00

## 2024-04-02 NOTE — PROGRESS NOTES
Per verbal order from Dr Kavya Jiménez, draw up 1ml of 1% lidocaine, 1ml of Dexamethasone and 1ml of Kenalog 40 for injection to  left heel Na HUANG RN  Patient given cortisone information handout.

## 2024-04-02 NOTE — PROGRESS NOTES
[facility] Podiatry  Progress Note      Kang Tavera is a 73 year old male.   Chief Complaint   Patient presents with    Ulcer     F/u - left foot - pain rated 8/10 but can go as high as 10/10 - I callus area - also inquiring about cortisone shot - pain with palpation and ambulation             HPI:     Patient is a pleasant 73-year-old male who presents to clinic for evaluation of a painful callus on the left posterior heel.  Pt had a wound at that location in 1986 and it was surgically debrided removing a lot of scar tissue.  He relates  that since the surgery he has had pain in the area with callus formation.  He does admit to debriding it at home sometimes himself to provide temporary relief.    Allergies: Patient has no known allergies.    Current Outpatient Medications   Medication Sig Dispense Refill    losartan 50 MG Oral Tab Take 1 tablet (50 mg total) by mouth daily.      mupirocin 2 % External Ointment Apply 1 Application topically As Directed. Apply Every other day to left heel wound 44 each 0    amLODIPine 5 MG Oral Tab Take 1 tablet (5 mg total) by mouth daily. For high blood pressure.      Vitamin C 500 MG Oral Tab Take 1 tablet (500 mg total) by mouth daily.      cholecalciferol 50 MCG (2000 UT) Oral Cap Take 1 capsule (2,000 Units total) by mouth daily.      B Complex Vitamins (VITAMIN B COMPLEX OR) Take by mouth.      omega-3 fatty acids 1000 MG Oral Cap Take 1,000 mg by mouth daily.      NON FORMULARY Take by mouth daily. Stress formula vitamin B      Multiple Vitamins-Minerals (CENTRUM SPECIALIST HEART) Oral Tab Take by mouth.      atorvastatin 20 MG Oral Tab TAKE 1 TABLET BY MOUTH EVERY DAY AT NIGHT 90 tablet 3    Mometasone Furoate 0.1 % External Cream Apply 1 Application. topically 2 (two) times daily as needed. 50 g 0    aspirin (ECOTRIN LOW STRENGTH) 81 MG Oral Tab EC Take 1 tablet (81 mg total) by mouth daily. 90 tablet 3      Past Medical History:   Diagnosis Date    Essential  hypertension     High blood pressure     High cholesterol     Hyperlipidemia     Leg fracture, left     HARDWARE PLACEMENT/REMOVAL      Past Surgical History:   Procedure Laterality Date    COLONOSCOPY  2015    COLONOSCOPY N/A 10/23/2018    Procedure: COLONOSCOPY;  Surgeon: Sang Manuel MD;  Location: [facility] ENDO    COLONOSCOPY N/A 10/31/2023    Procedure: COLONOSCOPY;  Surgeon: Sang Manuel MD;  Location: [facility] ENDO      Family History   Problem Relation Age of Onset    Breast Cancer Mother     Diabetes Paternal Grandfather     Other (Other[other]) Father       Social History     Socioeconomic History    Marital status:    Occupational History    Occupation: [employer]   Tobacco Use    Smoking status: Never    Smokeless tobacco: Never   Vaping Use    Vaping Use: Never used   Substance and Sexual Activity    Alcohol use: Yes     Comment: occassionally    Drug use: No   Other Topics Concern    Caffeine Concern No    Pt has a pacemaker No    Pt has a defibrillator No    Reaction to local anesthetic No           REVIEW OF SYSTEMS:     Denies nause, fever, chills  No calf pain  Denies chest pain or SOB      EXAM:   There were no vitals taken for this visit.  GENERAL: well developed, well nourished, in no apparent distress  EXTREMITIES:   1. Integument: Normal skin temperature and turgor. HPK tissue to posterior left heel with no underlying ulcerations.   2. Vascular: Dorsalis pedis two out of four bilateral and posterior tibial pulses two out of   four bilateral, capillary refill normal.   3. Musculoskeletal: All muscle groups are graded 5 out of 5 in the foot and ankle. Pain with palpation to left posterior heel   4. Neurological: Normal sharp dull sensation; reflexes normal.             ASSESSMENT AND PLAN:   Diagnoses and all orders for this visit:    Intractable left heel pain  -     dexamethasone (Decadron) 4 MG/ML injection 4 mg  -     triamcinolone acetonide (Kenalog-40) 40 MG/ML injection 40  mg          Plan:     Patient seen and examined and findings discussed with patient.  Informed patient that majority of his pain secondary to loss of in the posterior left heel site where he had his procedure in 1986.  Informed patient that we could attempt a cortisone injection to help with the pain and inflammation to the left heel.  No guarantees were made or implied.  After cleansing the left heel a cortisone injection including 1 cc of 1% lidocaine, 1 cc of dexamethasone and 1 cc of Kenalog 40 was administered to the left posterior heel.  Patient tolerated the procedure well.  Discussed steroid flareup with patient and advised him to ice the area.  Educated patient acute signs of infection and instructed him to seek immediate medical attention if symptoms arise.  Advised patient to return to clinic in 4 weeks if symptoms do not improve or worsen.      The patient indicates understanding of these issues and agrees to the plan.        Kavya Jiménez DPM

## 2024-04-17 ENCOUNTER — OFFICE VISIT (OUTPATIENT)
Dept: DERMATOLOGY CLINIC | Facility: CLINIC | Age: 74
End: 2024-04-17

## 2024-04-17 DIAGNOSIS — L81.4 LENTIGINES: ICD-10-CM

## 2024-04-17 DIAGNOSIS — L82.1 SEBORRHEIC KERATOSES: ICD-10-CM

## 2024-04-17 DIAGNOSIS — D48.5 NEOPLASM OF UNCERTAIN BEHAVIOR OF SKIN: Primary | ICD-10-CM

## 2024-04-17 DIAGNOSIS — B07.9 VERRUCA VULGARIS: ICD-10-CM

## 2024-04-17 PROCEDURE — 99203 OFFICE O/P NEW LOW 30 MIN: CPT | Performed by: STUDENT IN AN ORGANIZED HEALTH CARE EDUCATION/TRAINING PROGRAM

## 2024-04-17 PROCEDURE — 88305 TISSUE EXAM BY PATHOLOGIST: CPT | Performed by: STUDENT IN AN ORGANIZED HEALTH CARE EDUCATION/TRAINING PROGRAM

## 2024-04-17 PROCEDURE — 88342 IMHCHEM/IMCYTCHM 1ST ANTB: CPT | Performed by: STUDENT IN AN ORGANIZED HEALTH CARE EDUCATION/TRAINING PROGRAM

## 2024-04-17 PROCEDURE — 17110 DESTRUCTION B9 LES UP TO 14: CPT | Performed by: STUDENT IN AN ORGANIZED HEALTH CARE EDUCATION/TRAINING PROGRAM

## 2024-04-17 PROCEDURE — 11102 TANGNTL BX SKIN SINGLE LES: CPT | Performed by: STUDENT IN AN ORGANIZED HEALTH CARE EDUCATION/TRAINING PROGRAM

## 2024-04-17 NOTE — PROGRESS NOTES
New Patient    Referred by: No referring provider defined for this encounter.    CHIEF COMPLAINT: Lesion of concern     HISTORY OF PRESENT ILLNESS: Kang Tavera is a 73 year old male here for evaluation of lesion of concern.    1. Growth   Location: left upper back and   Duration: years   Signs and symptoms: itchy sometimes     2. Growth   Location: right mid back   Duration: years   Signs and symptoms: sometimes itchy     3. Wart    Location: left elbow     Personal Dermatologic History  History of skin cancer: no   History of  atypical moles: no     FAMILY HISTORY:  History of melanoma: No    Past Medical History  Past Medical History:    Essential hypertension    High blood pressure    High cholesterol    Hyperlipidemia    Leg fracture, left    HARDWARE PLACEMENT/REMOVAL       REVIEW OF SYSTEMS:  Constitutional: Denies fever, chills, unintentional weight loss.   Skin as per HPI    Medications  Current Outpatient Medications   Medication Sig Dispense Refill    losartan 50 MG Oral Tab Take 1 tablet (50 mg total) by mouth daily.      mupirocin 2 % External Ointment Apply 1 Application topically As Directed. Apply Every other day to left heel wound 44 each 0    amLODIPine 5 MG Oral Tab Take 1 tablet (5 mg total) by mouth daily. For high blood pressure.      Vitamin C 500 MG Oral Tab Take 1 tablet (500 mg total) by mouth daily.      cholecalciferol 50 MCG (2000 UT) Oral Cap Take 1 capsule (2,000 Units total) by mouth daily.      B Complex Vitamins (VITAMIN B COMPLEX OR) Take by mouth.      omega-3 fatty acids 1000 MG Oral Cap Take 1,000 mg by mouth daily.      NON FORMULARY Take by mouth daily. Stress formula vitamin B      Multiple Vitamins-Minerals (CENTRUM SPECIALIST HEART) Oral Tab Take by mouth.      atorvastatin 20 MG Oral Tab TAKE 1 TABLET BY MOUTH EVERY DAY AT NIGHT 90 tablet 3    Mometasone Furoate 0.1 % External Cream Apply 1 Application. topically 2 (two) times daily as needed. 50 g 0    aspirin (ECOTRIN LOW  STRENGTH) 81 MG Oral Tab EC Take 1 tablet (81 mg total) by mouth daily. 90 tablet 3       PHYSICAL EXAM:  General: awake, alert, no acute distress  Neuropsych: appropriate mood and affect  Eyes: Sclerae anicteric, without conjunctival injection, eyelids unremarkable  Skin: Skin exam was performed today including the following: Trunj. Pertinent findings include:   -With stellate light brown macules  - With brown stuck on papules  - Left upper back with an irregular brown macule.    -Left shoulder with a verrucous papule    ASSESSMENT & PLAN:  Pathophysiology of diagnoses discussed with patient.  Therapeutic options reviewed. Risks, benefits, and alternatives discussed with patient. Instructions reviewed at length.    #Lentigines  - Discussed benign appearance and provided reassurance. No treatment but observation at this time. Follow-up for concerning physical changes or new symptoms.  - Recommend sun protection with spf 30 or higher, sun protective clothing such as wide brimmed hats and long sleeves. Recommend avoiding midday sun (10 am- 3 pm).     #Seborrheic Keratoses  - Reassured patient regarding benign nature of lesions. No treatment but observation at this time. Follow-up for concerning physical changes or new symptoms.    #Neoplasm(s) of uncertain behavior of skin  - Shave biopsy performed today   - Will notify patient with results and arrange for appropriate definitive treatment, if indicated.      Shave of lesion to establish and confirm diagnosis:  Photo taken: Yes    Risks, benefits, alternatives and personnel required for shave biopsy reviewed with patient. Risks discussed include, but not limited to: pain, bleeding, infection, scar, reaction to anesthetic, and recurrence/need for further treatment.  Patient and physician agree as to site(s) to be biopsied. Patient verbalizes understanding and wishes to proceed.     Site(s) prepped with alcohol and anesthetized with 1% lidocaine with epinephrine.   Shave  of lesion(s) performed to the level of the dermis. Specimen(s) from A. L upper back  sent for pathology to r/o MM 50% ALCL and bandaging applied.   Written and verbal wound care instructions provided to patient, understanding verbalized.    #Verruca vulgaris  - Patient elected cryotherapy today     - Cryosurgery of non-malignant lesion(s)    Risks, benefits, alternatives and personnel required for cryosurgery reviewed with patient. Possible adverse effects reviewed including, but not limited to: redness, swelling, blister formation, postinflammatory pigment alteration, ring wart formation, scarring, recurrence. Pt verbalizes understanding and wishes to proceed.     Cryosurgery performed with Liquid Nitrogen via cryostat spray gun to warts. 1 lesion(s) treated.     Patient tolerated well.      Return to clinic: pending biopsy or sooner if something concerning arises     Samuel Aguilar MD

## 2024-04-22 NOTE — PROGRESS NOTES
Logged in path book and pmh. Pt informed of pathology results and recommendations for treatment. Pt provided with contact information for Dr. Bashir.  Pt also advised to follow up in 3 months for a FBSE.

## 2024-04-23 DIAGNOSIS — E78.2 MIXED HYPERLIPIDEMIA: ICD-10-CM

## 2024-04-24 ENCOUNTER — TELEPHONE (OUTPATIENT)
Dept: SURGERY | Facility: CLINIC | Age: 74
End: 2024-04-24

## 2024-04-24 RX ORDER — ATORVASTATIN CALCIUM 20 MG/1
TABLET, FILM COATED ORAL
Qty: 90 TABLET | Refills: 3 | Status: SHIPPED | OUTPATIENT
Start: 2024-04-24

## 2024-04-24 NOTE — TELEPHONE ENCOUNTER
Refill passed per [facility] protocol.    Requested Prescriptions   Pending Prescriptions Disp Refills    atorvastatin 20 MG Oral Tab 90 tablet 3     Sig: TAKE 1 TABLET BY MOUTH EVERY DAY AT NIGHT       Cholesterol Medication Protocol Passed - 4/23/2024 10:01 AM        Passed - ALT < 80     Lab Results   Component Value Date    ALT 24 03/14/2024             Passed - ALT resulted within past year        Passed - Lipid panel within past 12 months     Lab Results   Component Value Date    CHOLEST 156 03/14/2024    TRIG 98 03/14/2024    HDL 57 03/14/2024    LDL 81 03/14/2024    VLDL 15 03/14/2024    NONHDLC 99 03/14/2024             Passed - In person appointment or virtual visit in the past 12 mos or appointment in next 3 mos     Recent Outpatient Visits              1 week ago Neoplasm of uncertain behavior of skin    [facility] Samuel Aguilar MD    Office Visit    3 weeks ago Intractable left heel pain    [facility] Kavya Jiménez DPM    Office Visit    4 weeks ago Seborrheic dermatitis    [facility], [place] Osmin Hooker DO    Office Visit    1 month ago Ulcer of left foot, limited to breakdown of skin (HCC)    [facility] Kavya Jiménez DPM    Office Visit    1 month ago Medicare annual wellness visit, subsequent    [facility], [address], [place] Osmin Hooker DO    Office Visit          Future Appointments         Provider Department Appt Notes    In 6 days Grayson Bashir MD [facility] Referred by Dr. Aguilar   Reason- Mole on back    In 5 months Osmin Hooker DO [facility], [address], [place] 6 months follow up                       Recent Outpatient Visits              1 week ago Neoplasm of uncertain behavior of skin     [facility] Samuel Aguilar MD    Office Visit    3 weeks ago Intractable left heel pain    [facility] Kavya Jiménez DPM    Office Visit    4 weeks ago Seborrheic dermatitis    [facility], [address], [place] Osmin Hooker DO    Office Visit    1 month ago Ulcer of left foot, limited to breakdown of skin [facility]HCC)    [facility]    Office Visit    1 month ago Medicare annual wellness visit, subsequent    [facility], [address], [place] Osmin Hooker,     Office Visit          Future Appointments         Provider Department Appt Notes    In 6 days Grayson Bashir MD [facility] Referred by Dr. Aguilar   Reason- Mole on back    In 5 months Osmin Hooker DO [facility], [address], [place] 6 months follow up

## 2024-04-29 NOTE — H&P (VIEW-ONLY)
[facility]    Patient Name:  Kang Tavera   YOB: 1950   Gender:  Male   Appt Date:  4/30/2024   Provider:  Grayson Bashir MD   Insurance:  MEDICARE PART B ONLY     PATIENT PROVIDERS  Referring Provider: No ref. provider found   Address: No referring provider defined for this encounter.   Phone #: N/A    Primary Care Provider:Osmin Hooker DO   Address: [address] [place] [address]   Phone #: [phone]       CHIEF COMPLAINT  Chief Complaint   Patient presents with    Consult     Ref by         PROBLEMS  Reviewed   Patient Active Problem List   Diagnosis    Lumbago    Sebaceous cyst    Colon polyps    Mixed hyperlipidemia    Essential hypertension    Eustachian tube disorder    Welcome to Medicare preventive visit    Dermatitis    Prostate cancer screening    Osteoarthritis of right sacroiliac joint (HCC)        History of Present Illness:  Kang Tavera is a 73 year old Male with PMHx HLD, HTN who is referred by Dr. Aguilar to render an opinion regarding the surgical management of left upper back melanoma.    Patient presented to dermatologist for evaluation of concerning lesions. Left upper back lesion has been present for years. Endorses itching, denies pain or bleeding. Shave biopsy on 4/17 demonstrates focally invasive melanoma. Breslow thickness 0.2, no ulceration, margins negative. Stage T1a.    Patient presents to discuss surgical options. Denies personal/family history of melanoma. Taking daily baby aspirin.     Vital Signs:  BP (!) 164/75 (BP Location: Left arm, Patient Position: Sitting, Cuff Size: adult)   Pulse 81   Temp 98.2 °F (36.8 °C) (Temporal)   Resp 16   Ht 1.753 m (5' 9\")   Wt 88.9 kg (196 lb)   SpO2 99%   BMI 28.94 kg/m²      Medications Reviewed:    Current Outpatient Medications:     atorvastatin 20 MG Oral Tab, TAKE 1 TABLET BY MOUTH EVERY DAY AT NIGHT, Disp: 90 tablet, Rfl: 3    losartan 50 MG Oral  Tab, Take 1 tablet (50 mg total) by mouth daily., Disp: , Rfl:     mupirocin 2 % External Ointment, Apply 1 Application topically As Directed. Apply Every other day to left heel wound, Disp: 44 each, Rfl: 0    amLODIPine 5 MG Oral Tab, Take 1 tablet (5 mg total) by mouth daily. For high blood pressure., Disp: , Rfl:     Vitamin C 500 MG Oral Tab, Take 1 tablet (500 mg total) by mouth daily., Disp: , Rfl:     cholecalciferol 50 MCG (2000 UT) Oral Cap, Take 1 capsule (2,000 Units total) by mouth daily., Disp: , Rfl:     B Complex Vitamins (VITAMIN B COMPLEX OR), Take by mouth., Disp: , Rfl:     omega-3 fatty acids 1000 MG Oral Cap, Take 1,000 mg by mouth daily., Disp: , Rfl:     NON FORMULARY, Take by mouth daily. Stress formula vitamin B, Disp: , Rfl:     Multiple Vitamins-Minerals (CENTRUM SPECIALIST HEART) Oral Tab, Take by mouth., Disp: , Rfl:     Mometasone Furoate 0.1 % External Cream, Apply 1 Application. topically 2 (two) times daily as needed., Disp: 50 g, Rfl: 0    aspirin (ECOTRIN LOW STRENGTH) 81 MG Oral Tab EC, Take 1 tablet (81 mg total) by mouth daily., Disp: 90 tablet, Rfl: 3     Allergies Reviewed:  No Known Allergies     History:  Reviewed:  Past Medical History:    Essential hypertension    High blood pressure    High cholesterol    Hyperlipidemia    Invasive Melanoma (HCC) 0.2 mm    Left upper back    Leg fracture, left    HARDWARE PLACEMENT/REMOVAL      Reviewed:  Past Surgical History:   Procedure Laterality Date    Colonoscopy  2015    Colonoscopy N/A 10/23/2018    Procedure: COLONOSCOPY;  Surgeon: Sang Manuel MD;  Location: [facility]    Colonoscopy N/A 10/31/2023    Procedure: COLONOSCOPY;  Surgeon: Sang Manuel MD;  Location: [facility]      Reviewed Social History:  Social History     Socioeconomic History    Marital status:    Occupational History    Occupation: Sales   Tobacco Use    Smoking status: Never    Smokeless tobacco: Never   Vaping Use    Vaping status: Never Used    Substance and Sexual Activity    Alcohol use: Yes     Comment: occassionally    Drug use: No   Other Topics Concern    Caffeine Concern No    Pt has a pacemaker No    Pt has a defibrillator No    Reaction to local anesthetic No      Reviewed:  Family History   Problem Relation Age of Onset    Breast Cancer Mother     Diabetes Paternal Grandfather     Other (Other[other]) Father         Review of Systems:  Review of Systems   Constitutional:  Negative for activity change, appetite change, chills, fatigue, fever and unexpected weight change.   HENT:  Negative for mouth sores, nosebleeds and trouble swallowing.    Eyes:  Negative for discharge and redness.   Respiratory:  Negative for cough, shortness of breath and wheezing.    Cardiovascular:  Negative for chest pain.   Gastrointestinal:  Negative for abdominal distention, abdominal pain, blood in stool, nausea and vomiting.   Genitourinary:  Negative for difficulty urinating and dysuria.   Musculoskeletal:  Negative for back pain and gait problem.   Skin:  Negative for color change.   Allergic/Immunologic: Negative for immunocompromised state.   Neurological:  Negative for speech difficulty, weakness and numbness.   Psychiatric/Behavioral:  Negative for confusion.         Physical Examination:  Physical Exam  Constitutional:       General: He is not in acute distress.     Appearance: He is well-developed. He is not diaphoretic.   HENT:      Head: Normocephalic and atraumatic.   Eyes:      General:         Right eye: No discharge.         Left eye: No discharge.      Conjunctiva/sclera: Conjunctivae normal.      Pupils: Pupils are equal, round, and reactive to light.   Neck:      Thyroid: No thyromegaly.   Cardiovascular:      Rate and Rhythm: Normal rate and regular rhythm.      Heart sounds: No murmur heard.  Pulmonary:      Effort: No respiratory distress.      Breath sounds: Normal breath sounds. No wheezing.   Abdominal:      General: Bowel sounds are normal.  There is no distension.      Palpations: Abdomen is soft.      Tenderness: There is no abdominal tenderness.      Hernia: No hernia is present.   Musculoskeletal:         General: Normal range of motion.   Skin:     General: Skin is warm and dry.   Neurological:      Mental Status: He is alert and oriented to person, place, and time.          Document Review:  Pathology - 4/17/2024  Final Diagnosis:   Skin, left upper back, shave biopsy:  -Focally invasive melanoma, Breslow depth 0.2 mm, see synoptic report.  -Associated compound nevus        Assessment / Plan:  T1a melanoma of upper back    Recommend wide local excision  Discussed indication and conduct of surgery and nature of disease with the patient at length.  Risks of the procedure were explained to the patient in detail including bleeding, infection, cardiopulmonary risks of anesthesia. Patient understands these risks and agrees to proceed.   I answered all questions to the best of my ability.  We will find a date.    Grayson Bashir MD  Complex General Surgical Oncology  [facility]  [email or web address]

## 2024-04-29 NOTE — CONSULTS
[facility]    Patient Name:  Kang Tavera   YOB: 1950   Gender:  Male   Appt Date:  4/30/2024   Provider:  Grayson Bashir MD   Insurance:  MEDICARE PART B ONLY     PATIENT PROVIDERS  Referring Provider: No ref. provider found   Address: No referring provider defined for this encounter.   Phone #: N/A    Primary Care Provider:Osmin Hooker DO   Address: [address] [place] [address]   Phone #: [phone]       CHIEF COMPLAINT  Chief Complaint   Patient presents with    Consult     Ref by         PROBLEMS  Reviewed   Patient Active Problem List   Diagnosis    Lumbago    Sebaceous cyst    Colon polyps    Mixed hyperlipidemia    Essential hypertension    Eustachian tube disorder    Welcome to Medicare preventive visit    Dermatitis    Prostate cancer screening    Osteoarthritis of right sacroiliac joint (HCC)        History of Present Illness:  Kang Tavera is a 73 year old Male with PMHx HLD, HTN who is referred by Dr. Aguilar to render an opinion regarding the surgical management of left upper back melanoma.    Patient presented to dermatologist for evaluation of concerning lesions. Left upper back lesion has been present for years. Endorses itching, denies pain or bleeding. Shave biopsy on 4/17 demonstrates focally invasive melanoma. Breslow thickness 0.2, no ulceration, margins negative. Stage T1a.    Patient presents to discuss surgical options. Denies personal/family history of melanoma. Taking daily baby aspirin.     Vital Signs:  BP (!) 164/75 (BP Location: Left arm, Patient Position: Sitting, Cuff Size: adult)   Pulse 81   Temp 98.2 °F (36.8 °C) (Temporal)   Resp 16   Ht 1.753 m (5' 9\")   Wt 88.9 kg (196 lb)   SpO2 99%   BMI 28.94 kg/m²      Medications Reviewed:    Current Outpatient Medications:     atorvastatin 20 MG Oral Tab, TAKE 1 TABLET BY MOUTH EVERY DAY AT NIGHT, Disp: 90 tablet, Rfl: 3    losartan 50 MG Oral  Tab, Take 1 tablet (50 mg total) by mouth daily., Disp: , Rfl:     mupirocin 2 % External Ointment, Apply 1 Application topically As Directed. Apply Every other day to left heel wound, Disp: 44 each, Rfl: 0    amLODIPine 5 MG Oral Tab, Take 1 tablet (5 mg total) by mouth daily. For high blood pressure., Disp: , Rfl:     Vitamin C 500 MG Oral Tab, Take 1 tablet (500 mg total) by mouth daily., Disp: , Rfl:     cholecalciferol 50 MCG (2000 UT) Oral Cap, Take 1 capsule (2,000 Units total) by mouth daily., Disp: , Rfl:     B Complex Vitamins (VITAMIN B COMPLEX OR), Take by mouth., Disp: , Rfl:     omega-3 fatty acids 1000 MG Oral Cap, Take 1,000 mg by mouth daily., Disp: , Rfl:     NON FORMULARY, Take by mouth daily. Stress formula vitamin B, Disp: , Rfl:     Multiple Vitamins-Minerals (CENTRUM SPECIALIST HEART) Oral Tab, Take by mouth., Disp: , Rfl:     Mometasone Furoate 0.1 % External Cream, Apply 1 Application. topically 2 (two) times daily as needed., Disp: 50 g, Rfl: 0    aspirin (ECOTRIN LOW STRENGTH) 81 MG Oral Tab EC, Take 1 tablet (81 mg total) by mouth daily., Disp: 90 tablet, Rfl: 3     Allergies Reviewed:  No Known Allergies     History:  Reviewed:  Past Medical History:    Essential hypertension    High blood pressure    High cholesterol    Hyperlipidemia    Invasive Melanoma (HCC) 0.2 mm    Left upper back    Leg fracture, left    HARDWARE PLACEMENT/REMOVAL      Reviewed:  Past Surgical History:   Procedure Laterality Date    Colonoscopy  2015    Colonoscopy N/A 10/23/2018    Procedure: COLONOSCOPY;  Surgeon: Sang Manuel MD;  Location: [facility]    Colonoscopy N/A 10/31/2023    Procedure: COLONOSCOPY;  Surgeon: Sang Manuel MD;  Location: [facility]      Reviewed Social History:  Social History     Socioeconomic History    Marital status:    Occupational History    Occupation: Sales   Tobacco Use    Smoking status: Never    Smokeless tobacco: Never   Vaping Use    Vaping status: Never Used    Substance and Sexual Activity    Alcohol use: Yes     Comment: occassionally    Drug use: No   Other Topics Concern    Caffeine Concern No    Pt has a pacemaker No    Pt has a defibrillator No    Reaction to local anesthetic No      Reviewed:  Family History   Problem Relation Age of Onset    Breast Cancer Mother     Diabetes Paternal Grandfather     Other (Other[other]) Father         Review of Systems:  Review of Systems   Constitutional:  Negative for activity change, appetite change, chills, fatigue, fever and unexpected weight change.   HENT:  Negative for mouth sores, nosebleeds and trouble swallowing.    Eyes:  Negative for discharge and redness.   Respiratory:  Negative for cough, shortness of breath and wheezing.    Cardiovascular:  Negative for chest pain.   Gastrointestinal:  Negative for abdominal distention, abdominal pain, blood in stool, nausea and vomiting.   Genitourinary:  Negative for difficulty urinating and dysuria.   Musculoskeletal:  Negative for back pain and gait problem.   Skin:  Negative for color change.   Allergic/Immunologic: Negative for immunocompromised state.   Neurological:  Negative for speech difficulty, weakness and numbness.   Psychiatric/Behavioral:  Negative for confusion.         Physical Examination:  Physical Exam  Constitutional:       General: He is not in acute distress.     Appearance: He is well-developed. He is not diaphoretic.   HENT:      Head: Normocephalic and atraumatic.   Eyes:      General:         Right eye: No discharge.         Left eye: No discharge.      Conjunctiva/sclera: Conjunctivae normal.      Pupils: Pupils are equal, round, and reactive to light.   Neck:      Thyroid: No thyromegaly.   Cardiovascular:      Rate and Rhythm: Normal rate and regular rhythm.      Heart sounds: No murmur heard.  Pulmonary:      Effort: No respiratory distress.      Breath sounds: Normal breath sounds. No wheezing.   Abdominal:      General: Bowel sounds are normal.  There is no distension.      Palpations: Abdomen is soft.      Tenderness: There is no abdominal tenderness.      Hernia: No hernia is present.   Musculoskeletal:         General: Normal range of motion.   Skin:     General: Skin is warm and dry.   Neurological:      Mental Status: He is alert and oriented to person, place, and time.          Document Review:  Pathology - 4/17/2024  Final Diagnosis:   Skin, left upper back, shave biopsy:  -Focally invasive melanoma, Breslow depth 0.2 mm, see synoptic report.  -Associated compound nevus        Assessment / Plan:  T1a melanoma of upper back    Recommend wide local excision  Discussed indication and conduct of surgery and nature of disease with the patient at length.  Risks of the procedure were explained to the patient in detail including bleeding, infection, cardiopulmonary risks of anesthesia. Patient understands these risks and agrees to proceed.   I answered all questions to the best of my ability.  We will find a date.    Grayson Bashir MD  Complex General Surgical Oncology  [facility]  [email or web address]

## 2024-04-30 ENCOUNTER — OFFICE VISIT (OUTPATIENT)
Dept: SURGERY | Facility: CLINIC | Age: 74
End: 2024-04-30
Payer: MEDICARE

## 2024-04-30 VITALS
OXYGEN SATURATION: 99 % | SYSTOLIC BLOOD PRESSURE: 164 MMHG | DIASTOLIC BLOOD PRESSURE: 75 MMHG | WEIGHT: 196 LBS | TEMPERATURE: 98 F | HEIGHT: 69 IN | RESPIRATION RATE: 16 BRPM | BODY MASS INDEX: 29.03 KG/M2 | HEART RATE: 81 BPM

## 2024-04-30 DIAGNOSIS — C43.59 MALIGNANT MELANOMA OF UPPER BACK (HCC): Primary | ICD-10-CM

## 2024-04-30 PROCEDURE — 99205 OFFICE O/P NEW HI 60 MIN: CPT | Performed by: SURGERY

## 2024-04-30 NOTE — PATIENT INSTRUCTIONS
Surgery: Wide local excision of left upper back invasive melanoma    Date of Surgery: TBD    Surgery Length: 1 hour    Anesthesia: [x]Local   []MAC  [][facility]    Hospital:    [facility]- [address], [place] [address]   Phone: [phone]    This is an outpatient procedure.  Use the provided Chlorhexadine surgical soap(instructions attached) to shower the night before and morning of your procedure.  Do not apply powders, creams, lotions or deodorant after showering.  Do not apply any kind of makeup and make sure to remove nail polish prior to your surgery.  Bring your picture ID and insurance card with you.  Wear comfortable clothing that can easily be removed. Preferably, something that zips, snaps, or buttons up the front.   You will be contacted by the hospital the day prior to your surgery to confirm details and give you specific instructions about when and where to arrive the day of your procedure.   If you are taking blood thinners including: Plavix, Eliquis, Coumadin you will need to contact the prescribing provider for specific instructions on holding these medications for your procedure  Motrin, Advil, Ibuprofen, Aspirin, Baby Aspirin and Fish Oil are also blood thinners and need to be held at least one week prior to your procedure. It is okay to take Tylenol.  Inform your primary care physician of your surgery and ask if him/her will need to see you prior to surgery.      Pre-Operative Testing   CBC  CMP  BMP    PT, PTT, INR  UA  EKG    Chest X-Ray  Cardiac Clearance  [facility] Clearance     Does patient have pacemaker: [] YES [x] No Does patient have MAGDIEL:  [] YES [x] No  Is patient diabetic?  [] YES    [x] NO    Dr. Bashir's nurse direct line:  [phone]  Monday through Friday 8:30 am to 4:30 pm    For Dr. Bashir's office: [phone]/ Fax: [phone]  After hours you will reach the answering service     Central [phone] [place], [phone]  [place]  Medical Records:   [phone]

## 2024-05-01 ENCOUNTER — TELEPHONE (OUTPATIENT)
Dept: PODIATRY CLINIC | Facility: CLINIC | Age: 74
End: 2024-05-01

## 2024-05-01 ENCOUNTER — TELEPHONE (OUTPATIENT)
Dept: SURGERY | Facility: CLINIC | Age: 74
End: 2024-05-01

## 2024-05-01 ENCOUNTER — DOCUMENTATION ONLY (OUTPATIENT)
Dept: SURGERY | Facility: CLINIC | Age: 74
End: 2024-05-01

## 2024-05-01 DIAGNOSIS — C43.59 MALIGNANT MELANOMA OF UPPER BACK (HCC): Primary | ICD-10-CM

## 2024-05-01 NOTE — TELEPHONE ENCOUNTER
Spoke with patient. Endorses that callus that had cortisone injection has been painful for a few weeks. Last night, he was trimming area and it started oozing a small amount of beige discharge, about 1/8 teaspoon. More came out when he pushed on it. States there was no smell to the fluid. Denies redness, swelling, warmth, fever, chills. He started soaking it in warm soapy water, and applied the ointment you had prescribed prior to the area. States the pain/discomfort was a lot better since. He made appointment for Monday afternoon, he will be out of town until then. Advised to continue soaks and if any signs/symptoms of infection in the meantime to seek urgent care. Verbalized understanding.

## 2024-05-01 NOTE — TELEPHONE ENCOUNTER
Patient called back, spoke to hime regarding surgery date with  on 5/8/24 @[facility]. Patient verbally accepted. Pt knows to call if any further questions.

## 2024-05-01 NOTE — PROGRESS NOTES
Date of Surgery: 5/8/24    Diagnosis: Malignant melanoma of upper back (HCC)     Procedure: Wide local excision of left upper back invasive melanoma     Location: [x] [facility]  [] [facility]  [] [facility]    Type: [] Inpatient  [x] Outpatient  [] 23-hour observation    Number of days inpatient: 0    Length of Surgery: 1 hour    Anesthesia: [x] Local  [] MAC [] General  [] Pec Block     Joint case with: [] Yes,   [x] No   Needs SA:  [x] Yes  [] No    Special Equipment:    Penicillin Allergy: [] Yes [x] No   Nickel Allergy: [] Yes [x] No   Latex Allergy: [] Yes [x] No    Orders:  Colon Bundle/Bowel Prep: [] Yes  [x] No    Type and Cross 2 units PRBC: [] Yes [x] No    Type and Screen: [] Yes [x] No    Advanced Oncology Order Set (HIPEC): [] Yes [x] No    Heparin Pre-op (Heparin 5000 units subQ x 1 dose): [] Yes  [x] No    Nuclear Med Injection: [] Yes  [x] No    Wire localization needed: [] Yes [x] No    Midline placement (HIPEC,Whipple, Esophagectomy, Liver): [] Yes [x] No    CHG Cloths (HIPEC, Whipple, Esophagectomy, Liver): [] Yes [x] No    Tylenol administration prior to surgery: [] Yes [x] No    Does patient have a pacemaker: [] Yes [x] No  Sleep Apnea: [] Yes  [x] No      Is patient diabetic: [] Yes, if so, no Ensure/Gatorade [x] No    Antibiotics: [] /[facility] prophylactic antibiotic protocol [x] No need of antibiotics    PAT Orders: []CBC  []CMP []EKG  []CT Scan []Chest X-ray

## 2024-05-06 ENCOUNTER — OFFICE VISIT (OUTPATIENT)
Dept: PODIATRY CLINIC | Facility: CLINIC | Age: 74
End: 2024-05-06
Payer: MEDICARE

## 2024-05-06 ENCOUNTER — TELEPHONE (OUTPATIENT)
Dept: SURGERY | Facility: CLINIC | Age: 74
End: 2024-05-06

## 2024-05-06 DIAGNOSIS — L97.522 ULCER OF LEFT FOOT WITH FAT LAYER EXPOSED (HCC): Primary | ICD-10-CM

## 2024-05-06 NOTE — PROGRESS NOTES
As a follow-up, a second attempt has been made for outreach via fax, please see Contacts section for details      Thank you  [facility] Podiatry  Progress Note      Kang Tavera is a 73 year old male.   Chief Complaint   Patient presents with    Ulcer     F/u Left heel ulcer has serous drainage. Pain 2/10             HPI:       Patient is a 73-year-old male presents to clinic for evaluation of left heel ulceration.  Admits to removing hyperkeratotic tissue from the posterior left heel which resulted in some serous drainage.  Admits to 2 out of 10 pain.    Allergies: Patient has no known allergies.    Current Outpatient Medications   Medication Sig Dispense Refill    atorvastatin 20 MG Oral Tab TAKE 1 TABLET BY MOUTH EVERY DAY AT NIGHT 90 tablet 3    losartan 50 MG Oral Tab Take 1 tablet (50 mg total) by mouth daily.      mupirocin 2 % External Ointment Apply 1 Application topically As Directed. Apply Every other day to left heel wound 44 each 0    amLODIPine 5 MG Oral Tab Take 1 tablet (5 mg total) by mouth daily. For high blood pressure.      Vitamin C 500 MG Oral Tab Take 1 tablet (500 mg total) by mouth daily.      cholecalciferol 50 MCG (2000 UT) Oral Cap Take 1 capsule (2,000 Units total) by mouth daily.      B Complex Vitamins (VITAMIN B COMPLEX OR) Take by mouth.      omega-3 fatty acids 1000 MG Oral Cap Take 1,000 mg by mouth daily.      NON FORMULARY Take by mouth daily. Stress formula vitamin B      Multiple Vitamins-Minerals (CENTRUM SPECIALIST HEART) Oral Tab Take by mouth.      Mometasone Furoate 0.1 % External Cream Apply 1 Application. topically 2 (two) times daily as needed. 50 g 0    aspirin (ECOTRIN LOW STRENGTH) 81 MG Oral Tab EC Take 1 tablet (81 mg total) by mouth daily. 90 tablet 3      Past Medical History:    Essential hypertension    High blood pressure    High cholesterol    Hyperlipidemia    Invasive Melanoma (HCC) 0.2 mm    Left upper back    Leg fracture, left    HARDWARE PLACEMENT/REMOVAL      Past Surgical History:   Procedure Laterality Date    Colonoscopy  2015    Colonoscopy N/A 10/23/2018     Procedure: COLONOSCOPY;  Surgeon: Sang Manuel MD;  Location: [facility] ENDO    Colonoscopy N/A 10/31/2023    Procedure: COLONOSCOPY;  Surgeon: Sang Manuel MD;  Location: [facility]      Family History   Problem Relation Age of Onset    Breast Cancer Mother     Diabetes Paternal Grandfather     Other (Other[other]) Father       Social History     Socioeconomic History    Marital status:    Occupational History    Occupation: Sales   Tobacco Use    Smoking status: Never    Smokeless tobacco: Never   Vaping Use    Vaping status: Never Used   Substance and Sexual Activity    Alcohol use: Yes     Comment: occassionally    Drug use: No   Other Topics Concern    Caffeine Concern No    Pt has a pacemaker No    Pt has a defibrillator No    Reaction to local anesthetic No           REVIEW OF SYSTEMS:     Denies nause, fever, chills  No calf pain  Denies chest pain or SOB      EXAM:   There were no vitals taken for this visit.  GENERAL: well developed, well nourished, in no apparent distress  EXTREMITIES:   1. Integument: Normal skin temperature and turgor  2. Vascular: Dorsalis pedis two out of four bilateral and posterior tibial pulses two out of   four bilateral, capillary refill normal.   3. Musculoskeletal: All muscle groups are graded 5 out of 5 in the foot and ankle.   4. Neurological: Normal sharp dull sensation; reflexes normal.             ASSESSMENT AND PLAN:   Diagnoses and all orders for this visit:    Ulcer of left foot with fat layer exposed (HCC)        Plan:     Patient seen and examined and findings ghulam with patient. Pared HPK tissue to left heel with sterile #15 blade to healthy fat layer exposed. No purulent drainage or probing to bone noted. Dressed site with bacitracin and bandaid. Rx for mupirocin to be applied once daily with large bandaid.  Educated patient on acute signs of infection and instructed him to seek immediate medical attention if symptoms arise.  Return to clinic in 2 months  for reevaluation    The patient indicates understanding of these issues and agrees to the plan.        Kavya Jiménez DPM

## 2024-05-07 RX ORDER — LOSARTAN POTASSIUM 50 MG/1
50 TABLET ORAL NIGHTLY
Qty: 90 TABLET | Refills: 3 | Status: SHIPPED | OUTPATIENT
Start: 2024-05-07

## 2024-05-07 NOTE — DISCHARGE INSTRUCTIONS
Wide Excision of Melanoma Discharge Instructions  Thank you for choosing the Surgical Oncology team at [facility].        Managing Your Pain  Follow the guidelines below to help manage your pain at home:  Take your medications as directed and as needed. You may also take 1000 mg of acetaminophen (Tylenol®) every 6 hours as needed for pain.  Call our office if the medication prescribed for you does not ease your pain.  Don't drive or drink alcohol while you're taking prescription pain medications.  Keep track of when you take your pain medication. It works best 30 to 45 minutes after you take it.   Caring for Your Incision  It's normal for the skin below your incision to feel numb. This happens because some of the nerves were cut during your surgery. The numbness will go away over time.  Leave the dressing on for 48 hours after surgery. The clear outer covering (tegaderm) can then come off.   Your surgeon used dissolvable sutures (stitches) underneath your skin, and they will not need to be removed.   The Steri-Strips (small white adhesive strips) on your incision will loosen and fall off by themselves. If they haven't fallen off within 14 days, you can take them off.  If the area around your incision is red, puffy, or if you have any drainage from your incision, contact our office.  Showering  You may shower 48 hours after surgery. When you shower, use soap to gently wash your incision. After you shower, pat the area dry with a clean towel. Don't rub over your incision. Leave your incision uncovered, unless there's drainage.  Avoid tub baths until your surgeon says it's okay.  Activity  After the procedure, take it easy for the rest of the day.  If you had general anesthesia, don't use machinery or power tools, drink alcohol, or make any major decisions for at least the first 24 hours    When to Call:  Contact our office if you experience the following symptoms:  Fever of 100.4° F (38°C) or  higher  Cloudy or smelly drainage from the incision site  The skin around your incision is warm/red/swollen  Sudden increase in pain or new pain  Shaking chills  Fast pulse  Shortness of breath or chest pain  Nausea or vomiting   Diarrhea  Constipation that isn't relieved in 2 days  Any new or unexplained symptoms    We will call you to set up a follow-up appointment.    Please arrive at the following location:  [facility]: [address] [place] [address]  Please call us at [phone] if you are unable to make it to your appointment or if you have any questions.             Discharge Instructions: After Your Surgery  You’ve just had surgery. During surgery, you were given medicine called anesthesia to keep you relaxed and free of pain. After surgery, you may have some pain or nausea. This is common. Here are some tips for feeling better and getting well after surgery.   Going home  Your healthcare provider will show you how to take care of yourself when you go home. They'll also answer your questions. Have an adult family member or friend drive you home. For the first 24 hours after your surgery:   Don't drive or use heavy equipment.  Don't make important decisions or sign legal papers.  Take medicines as directed.  Don't drink alcohol.  Have someone stay with you, if needed. They can watch for problems and help keep you safe.  Be sure to go to all follow-up visits with your healthcare provider. And rest after your surgery for as long as your provider tells you to.   Coping with pain  If you have pain after surgery, pain medicine will help you feel better. Take it as directed, before pain becomes severe. Also, ask your healthcare provider or pharmacist about other ways to control pain. This might be with heat, ice, or relaxation. And follow any other instructions your surgeon or nurse gives you.      Stay on schedule with your medicine.     Tips for taking  pain medicine  To get the best relief possible, remember these points:   Pain medicines can upset your stomach. Taking them with a little food may help.  Most pain relievers taken by mouth need at least 20 to 30 minutes to start to work.  Don't wait till your pain becomes severe before you take your medicine. Try to time your medicine so that you can take it before starting an activity. This might be before you get dressed, go for a walk, or sit down for dinner.  Constipation is a common side effect of some pain medicines. Call your healthcare provider before taking any medicines such as laxatives or stool softeners to help ease constipation. Also ask if you should skip any foods. Drinking lots of fluids and eating foods such as fruits and vegetables that are high in fiber can also help. Remember, don't take laxatives unless your surgeon has prescribed them.  Drinking alcohol and taking pain medicine can cause dizziness and slow your breathing. It can even be deadly. Don't drink alcohol while taking pain medicine.  Pain medicine can make you react more slowly to things. Don't drive or run machinery while taking pain medicine.  Your healthcare provider may tell you to take acetaminophen to help ease your pain. Ask them how much you're supposed to take each day. Acetaminophen or other pain relievers may interact with your prescription medicines or other over-the-counter (OTC) medicines. Some prescription medicines have acetaminophen and other ingredients in them. Using both prescription and OTC acetaminophen for pain can cause you to accidentally overdose. Read the labels on your OTC medicines with care. This will help you to clearly know the list of ingredients, how much to take, and any warnings. It may also help you not take too much acetaminophen. If you have questions or don't understand the information, ask your pharmacist or healthcare provider to explain it to you before you take the OTC medicine.   Managing  nausea  Some people have an upset stomach (nausea) after surgery. This is often because of anesthesia, pain, or pain medicine, less movement of food in the stomach, or the stress of surgery. These tips will help you handle nausea and eat healthy foods as you get better. If you were on a special food plan before surgery, ask your healthcare provider if you should follow it while you get better. Check with your provider on how your eating should progress. It may depend on the surgery you had. These general tips may help:   Don't push yourself to eat. Your body will tell you when to eat and how much.  Start off with clear liquids and soup. They're easier to digest.  Next try semi-solid foods as you feel ready. These include mashed potatoes, applesauce, and gelatin.  Slowly move to solid foods. Don’t eat fatty, rich, or spicy foods at first.  Don't force yourself to have 3 large meals a day. Instead eat smaller amounts more often.  Take pain medicines with a small amount of solid food, such as crackers or toast. This helps prevent nausea.  When to call your healthcare provider  Call your healthcare provider right away if you have any of these:   You still have too much pain, or the pain gets worse, after taking the medicine. The medicine may not be strong enough. Or there may be a complication from the surgery.  You feel too sleepy, dizzy, or groggy. The medicine may be too strong.  Side effects such as nausea or vomiting. Your healthcare provider may advise taking other medicines to .  Skin changes such as rash, itching, or hives. This may mean you have an allergic reaction. Your provider may advise taking other medicines.  The incision looks different (for instance, part of it opens up).  Bleeding or fluid leaking from the incision site, and weren't told to expect that.  Fever of 100.4°F (38°C) or higher, or as directed by your provider.  Call 911  Call 911 right away if you have:   Trouble breathing  Facial  swelling    If you have obstructive sleep apnea   You were given anesthesia medicine during surgery to keep you comfortable and free of pain. After surgery, you may have more apnea spells because of this medicine and other medicines you were given. The spells may last longer than normal.    At home:  Keep using the continuous positive airway pressure (CPAP) device when you sleep. Unless your healthcare provider tells you not to, use it when you sleep, day or night. CPAP is a common device used to treat obstructive sleep apnea.  Talk with your provider before taking any pain medicine, muscle relaxants, or sedatives. Your provider will tell you about the possible dangers of taking these medicines.  Contact your provider if your sleeping changes a lot even when taking medicines as directed.  Kerry last reviewed this educational content on 10/1/2021  © 6518-9378 [employer]. All rights reserved. This information is not intended as a substitute for professional medical care. Always follow your healthcare professional's instructions.

## 2024-05-07 NOTE — TELEPHONE ENCOUNTER
BP out of range for protocol.  Please advise on refill request.     Requested Prescriptions     Pending Prescriptions Disp Refills    losartan 50 MG Oral Tab 90 tablet 3     Sig: Take 1 tablet (50 mg total) by mouth at bedtime.      Recent Visits  Date Type Provider Dept   03/26/24 Office Visit Osmin Hooker, DO [facility]   02/27/24 Office Visit Osmin Hooker, DO [facility]   04/28/23 Office Visit Osmin Hooker, DO [facility]   03/07/23 Office Visit Osmin Hooker, DO [facility]   02/14/23 Office Visit Osmin Hooker, DO [facility]   01/31/23 Office Visit Osimn Hooker, DO [facility]   Showing recent visits within past 540 days with a meds authorizing provider and meeting all other requirements  Future Appointments  Date Type Provider Dept   09/24/24 Appointment Osmin Hooker, DO [facility]   Showing future appointments within next 150 days with a meds authorizing provider and meeting all other requirements     Requested Prescriptions   Pending Prescriptions Disp Refills    losartan 50 MG Oral Tab 90 tablet 3     Sig: Take 1 tablet (50 mg total) by mouth at bedtime.       Hypertension Medications Protocol Failed - 5/7/2024 11:21 AM        Failed - Last BP reading less than 140/90     BP Readings from Last 1 Encounters:   04/30/24 (!) 164/75               Passed - CMP or BMP in past 12 months        Passed - In person appointment or virtual visit in the past 12 mos or appointment in next 3 mos     Recent Outpatient Visits              Yesterday Ulcer of left foot with fat layer exposed (HCC)    [facility], Kavya Aceves DPM    Office Visit    1 week ago Malignant melanoma of upper back (HCC)    [facility] Grayson Bashir MD    Office Visit    2 weeks ago Neoplasm of uncertain behavior of skin    [facility]  Samuel Aguilar MD    Office Visit    1 month ago [facility]    Office Visit    1 month ago Seborrheic dermatitis    [facility] [address], [place] Osmin Hooker,     Office Visit          Future Appointments         Provider Department Appt Notes    In 4 months Osmin Hooker,  [facility], [address], [place] 6 months follow up                    Passed - EGFRCR or GFRNAA > 50     GFR Evaluation  EGFRCR: 80 , resulted on 3/14/2024              Future Appointments         Provider Department Appt Notes    In 4 months Osmin Hooker,  [facility] [address], [place] 6 months follow up           Recent Outpatient Visits              Yesterday Ulcer of left foot with fat layer exposed (HCC)    [facility] Kavya Jiménez DPM    Office Visit    1 week ago Malignant melanoma of upper back (HCC)    [facility]    Office Visit    2 weeks ago [facility]    Office Visit    1 month ago Intractable left heel pain    [facility] [place] [facility]    Office Visit    1 month ago Seborrheic dermatitis    [facility], [address], [place] Osmin Hooker DO    Office Visit

## 2024-05-08 ENCOUNTER — HOSPITAL ENCOUNTER (OUTPATIENT)
Facility: HOSPITAL | Age: 74
Setting detail: HOSPITAL OUTPATIENT SURGERY
Discharge: HOME | End: 2024-05-08
Attending: SURGERY | Admitting: SURGERY

## 2024-05-08 VITALS
OXYGEN SATURATION: 98 % | TEMPERATURE: 99 F | WEIGHT: 200.31 LBS | HEART RATE: 98 BPM | RESPIRATION RATE: 15 BRPM | SYSTOLIC BLOOD PRESSURE: 170 MMHG | DIASTOLIC BLOOD PRESSURE: 88 MMHG | HEIGHT: 69 IN | BODY MASS INDEX: 29.67 KG/M2

## 2024-05-08 DIAGNOSIS — C43.59 MALIGNANT MELANOMA OF UPPER BACK (HCC): ICD-10-CM

## 2024-05-08 PROCEDURE — 88307 TISSUE EXAM BY PATHOLOGIST: CPT | Performed by: SURGERY

## 2024-05-08 PROCEDURE — 0HB6XZZ EXCISION OF BACK SKIN, EXTERNAL APPROACH: ICD-10-PCS | Performed by: SURGERY

## 2024-05-08 PROCEDURE — 88305 TISSUE EXAM BY PATHOLOGIST: CPT | Performed by: SURGERY

## 2024-05-08 RX ORDER — TRAMADOL HYDROCHLORIDE 50 MG/1
50 TABLET ORAL EVERY 4 HOURS PRN
Qty: 20 TABLET | Refills: 0 | Status: SHIPPED | OUTPATIENT
Start: 2024-05-08

## 2024-05-08 NOTE — INTERVAL H&P NOTE
Patient seen and examined. No changes to the attached history and physical are noted.     73 year old male presenting today for planned WLE.    Grayson Bashir MD  [facility] General Surgical Oncology  [facility]  Pager [phone]  [email or web address]

## 2024-05-08 NOTE — INTERVAL H&P NOTE
Patient seen and examined. No changes to the attached history and physical are noted.     73 year old male presenting today for planned left upper back WLE.    Grayson Bashir MD  Complex General Surgical Oncology  [facility]  Pager [phone]  [email or web address]

## 2024-05-13 NOTE — OPERATIVE REPORT
Date of operation 5/8/2024       inferiorly to close the wound with minimal tension.  The wound was then closed in 2 layers: 3-0 Vicryl interrupted subcutaneous and 4-0 Vicryl running subcuticular.  Wound was dressed.  Patient tolerated the procedure well and was taken to the postoperative seizure care in stable state.  I was present for the entire case.    Grayson Bashir MD  Complex General Surgical Oncology  [facility]  [email or web address]

## 2024-05-15 ENCOUNTER — TELEPHONE (OUTPATIENT)
Dept: SURGERY | Facility: CLINIC | Age: 74
End: 2024-05-15

## 2024-05-15 NOTE — TELEPHONE ENCOUNTER
Post op call made to patient. Patient states he is doing well. Denies fevers, no nausea or vomiting. States no pain. Patient is tolerating activity without complaints. No concerns with surgical sites. Wound care instructions provided. Path is still pending.    Post op appointment scheduled with Dr. Bashir on Tuesday, 5/21 at 10:15 am.    Patient agrees to call if any problems.

## 2024-05-21 ENCOUNTER — OFFICE VISIT (OUTPATIENT)
Dept: SURGERY | Facility: CLINIC | Age: 74
End: 2024-05-21

## 2024-05-21 VITALS
TEMPERATURE: 98 F | SYSTOLIC BLOOD PRESSURE: 137 MMHG | RESPIRATION RATE: 20 BRPM | HEART RATE: 90 BPM | DIASTOLIC BLOOD PRESSURE: 82 MMHG | WEIGHT: 196 LBS | BODY MASS INDEX: 29 KG/M2

## 2024-05-21 DIAGNOSIS — C43.59 MALIGNANT MELANOMA OF UPPER BACK (HCC): Primary | ICD-10-CM

## 2024-05-21 PROCEDURE — 99024 POSTOP FOLLOW-UP VISIT: CPT | Performed by: PHYSICIAN ASSISTANT

## 2024-05-21 NOTE — PROGRESS NOTES
[facility]    Patient Name:  Kang Tavera   YOB: 1950   Gender:  Male   Appt Date:  5/21/2024   Provider:  Grayson Bashir MD   Insurance:  MEDICARE PART B ONLY     PATIENT PROVIDERS  Referring Provider: No ref. provider found   Address: No referring provider defined for this encounter.   Phone #: N/A    Primary Care Provider:Osmin Hooker DO   Address: [address] [place] [address]   Phone #: [phone]       CHIEF COMPLAINT  Chief Complaint   Patient presents with    Post-Op        PROBLEMS  Reviewed   Patient Active Problem List   Diagnosis    Lumbago    Sebaceous cyst    Colon polyps    Mixed hyperlipidemia    Essential hypertension    Eustachian tube disorder    Welcome to Medicare preventive visit    Dermatitis    Prostate cancer screening    Osteoarthritis of right sacroiliac joint (HCC)        History of Present Illness:  Kang Tavera is a 73 year old Male with PMHx HLD, HTN who is referred by Dr. Aguilar to render an opinion regarding the surgical management of left upper back melanoma.    Patient presented to dermatologist for evaluation of concerning lesions. Left upper back lesion has been present for years. Endorses itching, denies pain or bleeding. Shave biopsy on 4/17 demonstrates focally invasive melanoma. Breslow thickness 0.2, no ulceration, margins negative. Underwent wide local excision on 5/8/2024. Pathology showed no residual invasive melanoma. Small intradermal melanocytis nevus identified but all margins negative. Finasl stage pT1a.    His is doing well from a post operative perspective. Denies pain, drainage from incision. NO concerns at this time.     Vital Signs:  /82 (BP Location: Left arm, Patient Position: Sitting, Cuff Size: adult)   Pulse 90   Temp 97.8 °F (36.6 °C) (Temporal)   Resp 20   Wt 88.9 kg (196 lb)   BMI 28.94 kg/m²      Medications Reviewed:    Current Outpatient Medications:      traMADol 50 MG Oral Tab, Take 1 tablet (50 mg total) by mouth every 4 (four) hours as needed for Pain., Disp: 20 tablet, Rfl: 0    losartan 50 MG Oral Tab, Take 1 tablet (50 mg total) by mouth at bedtime., Disp: 90 tablet, Rfl: 3    mupirocin 2 % External Ointment, Apply 1 Application topically daily., Disp: 30 g, Rfl: 1    atorvastatin 20 MG Oral Tab, TAKE 1 TABLET BY MOUTH EVERY DAY AT NIGHT, Disp: 90 tablet, Rfl: 3    amLODIPine 5 MG Oral Tab, Take 1 tablet (5 mg total) by mouth at bedtime. For high blood pressure., Disp: , Rfl:     Vitamin C 500 MG Oral Tab, Take 1 tablet (500 mg total) by mouth daily., Disp: , Rfl:     cholecalciferol 50 MCG (2000 UT) Oral Cap, Take 1 capsule (2,000 Units total) by mouth daily., Disp: , Rfl:     B Complex Vitamins (VITAMIN B COMPLEX OR), Take by mouth., Disp: , Rfl:     omega-3 fatty acids 1000 MG Oral Cap, Take 1,000 mg by mouth daily., Disp: , Rfl:     NON FORMULARY, Take by mouth daily. Stress formula vitamin B, Disp: , Rfl:     Multiple Vitamins-Minerals ([facility] [place] [facility]) Oral Tab, Take by mouth., Disp: , Rfl:     Mometasone Furoate 0.1 % External Cream, Apply 1 Application. topically 2 (two) times daily as needed., Disp: 50 g, Rfl: 0     Allergies Reviewed:  No Known Allergies     History:  Reviewed:  Past Medical History:    Essential hypertension    High blood pressure    High cholesterol    Hyperlipidemia    Invasive Melanoma (HCC) 0.2 mm    Left upper back    Leg fracture, left    HARDWARE PLACEMENT/REMOVAL      Reviewed:  Past Surgical History:   Procedure Laterality Date    Colonoscopy  2015    Colonoscopy N/A 10/23/2018    Procedure: COLONOSCOPY;  Surgeon: Sang Manuel MD;  Location: [facility]    Colonoscopy N/A 10/31/2023    Procedure: COLONOSCOPY;  Surgeon: Sang Manuel MD;  Location: [facility]      Reviewed Social History:  Social History     Socioeconomic History    Marital status:    Occupational History    Occupation: Sales   Tobacco  Use    Smoking status: Never    Smokeless tobacco: Never   Vaping Use    Vaping status: Never Used   Substance and Sexual Activity    Alcohol use: Yes     Comment: occassionally    Drug use: No   Other Topics Concern    Caffeine Concern No    Pt has a pacemaker No    Pt has a defibrillator No    Reaction to local anesthetic No      Reviewed:  Family History   Problem Relation Age of Onset    Breast Cancer Mother     Diabetes Paternal Grandfather     Other (Other[other]) Father         Review of Systems:  Review of Systems   Constitutional:  Negative for appetite change, fatigue and unexpected weight change.   HENT: Negative.     Eyes: Negative.    Respiratory:  Negative for shortness of breath.    Cardiovascular:  Negative for chest pain.   Gastrointestinal:  Negative for abdominal distention.   Endocrine: Negative.    Genitourinary: Negative.    Skin:  Negative for rash and wound.   Neurological:  Negative for weakness.   Hematological:  Negative for adenopathy.   Psychiatric/Behavioral: Negative.          Physical Examination:  Physical Exam  Constitutional:       General: He is not in acute distress.     Appearance: He is well-developed.   HENT:      Head: Normocephalic and atraumatic.   Eyes:      General: No scleral icterus.  Pulmonary:      Effort: Pulmonary effort is normal. No respiratory distress.   Abdominal:      General: There is no distension.      Palpations: Abdomen is soft.   Musculoskeletal:         General: Normal range of motion.      Cervical back: Normal range of motion and neck supple.   Lymphadenopathy:      Cervical:      Right cervical: No superficial, deep or posterior cervical adenopathy.     Left cervical: No superficial, deep or posterior cervical adenopathy.      Upper Body:      Right upper body: No supraclavicular, axillary or pectoral adenopathy.      Left upper body: No supraclavicular, axillary or pectoral adenopathy.      Lower Body: No right inguinal adenopathy. No left inguinal  adenopathy.   Skin:     General: Skin is warm and dry.          Neurological:      Mental Status: He is alert and oriented to person, place, and time.          Document Review:  Pathology - 5/8/2024  Final Diagnosis:      Skin, left upper back; wide local excision:   Previous biopsy site with dense reactive fibrosis and mild chronic inflammation.  No residual malignant melanoma is identified..  Remaining skin with small intradermal melanocytic nevus (1 mm),, seen at approximately 3 mm from the medial margin.  All inked specimen margins are free of malignancy.     Comment:  The patient had a previous left upper back skin shave biopsy with focally invasive melanoma, Breslow depth 0.2 mm, pT1a ([id number]; 4-).  No residual malignant melanoma is identified in the current left upper back wide local skin excision.          Assessment / Plan:  T1a melanoma of upper back    Doing well from a postoperative standpoint.  Discussed pathology results.  Per NCCN guidelines for surveillance, physical exams to be completed every 6-12 months for 5 years then annually thereafter. We will see patient back in 6 months.  Reiterated importance of skin exams. He will continue to follow with Dr. Aguilar for surveillance.  Discussed sun protection strategies.  All questions answered to the best of my ability.    Shirley Mittal PA-C  Department of Surgical Oncology  [facility]  [address] [place]  [address]  [facility]  [address] [place] [address]  T: [phone]  F: [phone]

## 2024-07-10 ENCOUNTER — OFFICE VISIT (OUTPATIENT)
Dept: DERMATOLOGY CLINIC | Facility: CLINIC | Age: 74
End: 2024-07-10
Payer: MEDICARE

## 2024-07-10 DIAGNOSIS — C43.0 MALIGNANT MELANOMA OF SKIN OF LIP (HCC): Primary | ICD-10-CM

## 2024-07-10 DIAGNOSIS — L81.4 LENTIGINES: ICD-10-CM

## 2024-07-10 DIAGNOSIS — D22.9 MULTIPLE BENIGN NEVI: ICD-10-CM

## 2024-07-10 DIAGNOSIS — D18.01 CHERRY ANGIOMA: ICD-10-CM

## 2024-07-10 DIAGNOSIS — L82.1 SEBORRHEIC KERATOSES: ICD-10-CM

## 2024-07-10 DIAGNOSIS — B07.9 VERRUCA VULGARIS: ICD-10-CM

## 2024-07-10 PROCEDURE — 99213 OFFICE O/P EST LOW 20 MIN: CPT | Performed by: STUDENT IN AN ORGANIZED HEALTH CARE EDUCATION/TRAINING PROGRAM

## 2024-07-10 PROCEDURE — 17000 DESTRUCT PREMALG LESION: CPT | Performed by: STUDENT IN AN ORGANIZED HEALTH CARE EDUCATION/TRAINING PROGRAM

## 2024-07-10 NOTE — PROGRESS NOTES
July 10, 2024    Established patient     CHIEF COMPLAINT: FBSE    HISTORY OF PRESENT ILLNESS: .    1. Lesion  Location: Leg; r thigh     Duration: unknown   Signs and symptoms: Dark spot, raised     2. Lesion   Location: Bridge of nose    Duration: 6 months   Signs and symptoms: Small dark spot and a bit raised     DERM HISTORY:  History of skin cancer: yes, hx of invasive melanoma    FAMILY HISTORY:  History of melanoma: No    PAST MEDICAL HISTORY:  Past Medical History:    Essential hypertension    High blood pressure    High cholesterol    Hyperlipidemia    Invasive Melanoma (HCC) 0.2 mm    Left upper back    Leg fracture, left    HARDWARE PLACEMENT/REMOVAL       REVIEW OF SYSTEMS:  Constitutional: Denies fever, chills, unintentional weight loss.   Skin as per HPI    Medications  Current Outpatient Medications   Medication Sig Dispense Refill    traMADol 50 MG Oral Tab Take 1 tablet (50 mg total) by mouth every 4 (four) hours as needed for Pain. 20 tablet 0    losartan 50 MG Oral Tab Take 1 tablet (50 mg total) by mouth at bedtime. 90 tablet 3    mupirocin 2 % External Ointment Apply 1 Application topically daily. 30 g 1    atorvastatin 20 MG Oral Tab TAKE 1 TABLET BY MOUTH EVERY DAY AT NIGHT 90 tablet 3    amLODIPine 5 MG Oral Tab Take 1 tablet (5 mg total) by mouth at bedtime. For high blood pressure.      Vitamin C 500 MG Oral Tab Take 1 tablet (500 mg total) by mouth daily.      cholecalciferol 50 MCG (2000 UT) Oral Cap Take 1 capsule (2,000 Units total) by mouth daily.      B Complex Vitamins (VITAMIN B COMPLEX OR) Take by mouth.      omega-3 fatty acids 1000 MG Oral Cap Take 1,000 mg by mouth daily.      NON FORMULARY Take by mouth daily. Stress formula vitamin B      Multiple Vitamins-Minerals ([facility] HEART) Oral Tab Take by mouth.      Mometasone Furoate 0.1 % External Cream Apply 1 Application. topically 2 (two) times daily as needed. 50 g 0       PHYSICAL EXAM:  Patient declined chaperone    General: awake, alert, no acute distress  Skin: Skin exam was performed today including the following: head and face, scalp, neck, chest (including breasts and axillae), abdomen, back, bilateral upper extremities, bilateral lower extremities, hands, feet, digits, nails. Pertinent findings include:   - Scattered bright red-purple dome-shaped papules on the trunk and extremities   - Scattered light brown stellate macules on sun exposed sites  - Scattered, evenly colored, round brown macules and papules with regular borders on the trunk and extremities  - Numerous scattered skin-colored and brown, waxy, stuck-on papules and plaques on the trunk and extremities  - Elbow with verrucous papule  - back with well healed surgical site     ASSESSMENT & PLAN:  Pathophysiology of diagnoses discussed with patient.  Therapeutic options reviewed. Risks, benefits, and alternatives discussed with patient. Instructions reviewed at length.    #Lentigines  #Seborrheic keratoses   #Cherry angiomas   - Reassurance provided regarding the benign nature of these lesions.    #Multiple benign nevi  - Complete skin exam performed today with no outlier lesions identified   - Reassured patient of benign nature of these lesions.   - Recommend daily photoprotection with broad-spectrum sunscreen, avoidance of sun during peak hours, and sun protective clothing.    - Dermoscopy was used for physical examination of pigmented lesions during today's office visit.    #History of invasive MM - L upper back   - No evidence of recurrence on exam. Continued monitoring. Regular skin exams discussed given increased risk of subsequent cutaneous malignancies.   - Should any areas change in size, shape or color, bleed or become tender, the patient will contact the office for evaluation sooner than their interval appointment.    #Verruca vulgaris  - Patient elected cryotherapy today     - Cryosurgery of non-malignant lesion(s)    Risks, benefits, alternatives  and personnel required for cryosurgery reviewed with patient. Possible adverse effects reviewed including, but not limited to: redness, swelling, blister formation, postinflammatory pigment alteration, ring wart formation, scarring, recurrence. Pt verbalizes understanding and wishes to proceed.     Cryosurgery performed with Liquid Nitrogen via cryostat spray gun to warts. 1 lesion(s) treated.     Patient tolerated well.      Return to clinic: 3 months or sooner if something concerning arises     Samuel Aguilar MD

## 2024-08-14 ENCOUNTER — TELEPHONE (OUTPATIENT)
Dept: CASE MANAGEMENT | Age: 74
End: 2024-08-14

## 2024-08-14 DIAGNOSIS — L98.499 SKIN ULCER, UNSPECIFIED ULCER STAGE (HCC): Primary | ICD-10-CM

## 2024-08-14 NOTE — TELEPHONE ENCOUNTER
Dr. Hooker,     Patient has Ulcer of left foot with fat layer exposed (HCC)     Patient said the podiatrist was unable to assist an he said that he was at the wound center at [facility] and they were of no help.     Patient would like to go to:   [facility]    Please call patient and pend referral to specialist you recommend.     Thank you

## 2024-08-15 NOTE — TELEPHONE ENCOUNTER
Referral entered.  Patient to see me in office tomorrow.   Patient Education     The Flu (Influenza)     The virus that causes the flu spreads through the air in droplets when someone who has the flu coughs, sneezes, laughs, or talks.   The flu (influenza) is an infection that affects your respiratory tract. This tract is made up of your mouth, nose, and lungs, and the passages between them. Unlike a cold, the flu can make you very ill. And it can lead to pneumonia, a serious lung infection. The flu can have serious complications and even cause death.  Who is at risk for the flu?  Anyone can get the flu. But you are more likely to become infected if you:  · Have a weakened immune system  · Work in a healthcare setting where you may be exposed to flu germs  · Live or work with someone who has the flu  · Haven’t had an annual flu shot  How does the flu spread?  The flu is caused by a virus. The virus spreads through the air in droplets when someone who has the flu coughs, sneezes, laughs, or talks. You can become infected when you inhale these viruses directly. You can also become infected when you touch a surface on which the droplets have landed and then transfer the germs to your eyes, nose, or mouth. Touching used tissues, or sharing utensils, drinking glasses, or a toothbrush from an infected person can expose you to flu viruses, too.  What are the symptoms of the flu?  Flu symptoms tend to come on quickly and may last a few days to a few weeks. They include:  · Fever usually higher than 100.4°F  (38°C) and chills  · Sore throat and headache  · Dry cough  · Runny nose  · Tiredness and weakness  · Muscle aches  Who is at risk for flu complications?  For some people, the flu can be very serious. The risk for complications is greater for:  · Children younger than age 5  · Adults ages 65 and older  · People with a chronic illness such as diabetes or heart, kidney, or lung disease  · People who live in a nursing home or long-term care facility   How is the flu treated?  The  flu usually gets better after 7 days or so. In some cases, your healthcare provider may prescribe an antiviral medicine. This may help you get well a little sooner. For the medicine to help, you need to take it as soon as possible (ideally within 48 hours) after your symptoms start. If you develop pneumonia or other serious illness, you may need to stay in the hospital.  Easing flu symptoms  · Drink lots of fluids such as water, juice, and warm soup. A good rule is to drink enough so that you urinate your normal amount.  · Get plenty of rest.  · Ask your healthcare provider what to take for fever and pain.  · Call your provider if your fever is 100.4°F (38°C) or higher, or you become dizzy, lightheaded, or short of breath.  Taking steps to protect others  · Wash your hands often, especially after coughing or sneezing. Or clean your hands with an alcohol-based hand  containing at least 60% alcohol.  · Cough or sneeze into a tissue. Then throw the tissue away and wash your hands. If you don’t have a tissue, cough and sneeze into your elbow.  · Stay home until at least 24 hours after you no longer have a fever or chills. Be sure the fever isn’t being hidden by fever-reducing medicine.  · Don’t share food, utensils, drinking glasses, or a toothbrush with others.  · Ask your healthcare provider if others in your household should get antiviral medicine to help them avoid infection.  How can the flu be prevented?  · One of the best ways to avoid the flu is to get a flu vaccine each year. The virus that causes the flu changes from year to year. For that reason, healthcare providers recommend getting the flu vaccine each year, as soon as it's available in your area. The vaccine is given as a shot. Your healthcare provider can tell you which vaccine is right for you. The nasal spray is not recommended for the 3724-2519 flu season. The CDC says the nasal spray did not seem to protect against the flu over the last  several flu seasons.  · Wash your hands often. Frequent handwashing is a proven way to help prevent infection.  · Carry an alcohol-based hand gel containing at least 60% alcohol. Use it when you can't use soap and water. Then wash your hands as soon as you can.  · Avoid touching your eyes, nose, and mouth.  · At home and work, clean phones, computer keyboards, and toys often with disinfectant wipes.  · If possible, avoid close contact with others who have the flu or symptoms of the flu.  Handwashing tips  Handwashing is one of the best ways to prevent many common infections. If you are caring for or visiting someone with the flu, wash your hands each time you enter and leave the room. Follow these steps:  · Use warm water and plenty of soap. Rub your hands together well.  · Clean the whole hand, including under your nails, between your fingers, and up the wrists.  · Wash for at least 15 seconds.  · Rinse, letting the water run down your fingers, not up your wrists.  · Dry your hands well. Use a paper towel to turn off the faucet and open the door.  Using alcohol-based hand   Alcohol-based hand  are also a good choice. Use them when you can't use soap and water. Follow these steps:  · Squeeze about a tablespoon of gel into the palm of one hand.  · Rub your hands together briskly, cleaning the backs of your hands, the palms, between your fingers, and up the wrists.  · Rub until the gel is gone and your hands are completely dry.  Preventing the flu in healthcare settings  The flu is a special concern for people in hospitals and long-term care facilities. To help prevent the spread of flu, many hospitals and nursing homes take these steps:  · Healthcare providers wash their hands or use an alcohol-based hand  before and after treating each patient.  · People with the flu have private rooms and bathrooms or share a room with someone with the same infection.  · People who are at high risk for the  flu but don't have it are encouraged to get the flu and pneumonia vaccines.  · All healthcare workers are encouraged or required to get flu shots.   Date Last Reviewed: 12/1/2016  © 6199-7507 [employer]. [address] [place] [address], [place] [address]. All rights reserved. This information is not intended as a substitute for professional medical care. Always follow your healthcare professional's instructions.

## 2024-08-15 NOTE — TELEPHONE ENCOUNTER
Left message for patient to return phone call, see below   Please schedule pt with  for tomorrow.

## 2024-08-28 DIAGNOSIS — C43.59 MALIGNANT MELANOMA OF UPPER BACK (HCC): ICD-10-CM

## 2024-08-28 RX ORDER — TRAMADOL HYDROCHLORIDE 50 MG/1
50 TABLET ORAL EVERY 4 HOURS PRN
Qty: 20 TABLET | Refills: 0 | OUTPATIENT
Start: 2024-08-28

## 2024-09-09 NOTE — TELEPHONE ENCOUNTER
Spoke to patient regarding consult appointment. Explained Dr. Bashir must meet patient for consult prior to scheduling surgery. Patient agreeable. All questions answered, advised to call back for any future questions or concerns. Patient verbalized understanding.   
[FreeTextEntry1] : Labs 3/2024: CBC normal  Chol 205 HDL 56  CMP normal Ca 9.6 PTH 18 TSH 0.55 A1c 5%  Labs 8/2022: CBC normal A1c 5.2%  Chol 190 HDL 56  CMP normal Phos 3.1 Ca 9.3 PTH 29 TSH 0.90 Free T4 1.4 Vit D 52.6  Labs 7/2019: PTH 45 TSH 0.71 CMP normal   Labs 5/2017: Ca 9.5 Glucose 89 Creatinine 0.68 Sodium 144  Labs 3/2016: PTH 39 CMP normal CBC normal  Labs 8/2015: CMP normal TSH 2.54 Free T4 1.2 LH 7.6 FSH 8.7 Prolactin 7.7 Estradiol 37 Cortisol 21 IGF-1 210  Labs 5/2015: LDL 93 A1c 4.4% Vit D 43 TSH 0.93 CMP normal except AST of 58

## 2024-09-10 ENCOUNTER — TELEPHONE (OUTPATIENT)
Dept: FAMILY MEDICINE CLINIC | Facility: CLINIC | Age: 74
End: 2024-09-10

## 2024-09-10 ENCOUNTER — LAB ENCOUNTER (OUTPATIENT)
Dept: LAB | Age: 74
End: 2024-09-10
Attending: INTERNAL MEDICINE
Payer: MEDICARE

## 2024-09-10 DIAGNOSIS — E78.2 MIXED HYPERLIPIDEMIA: Primary | ICD-10-CM

## 2024-09-10 DIAGNOSIS — I10 ESSENTIAL HYPERTENSION: ICD-10-CM

## 2024-09-10 LAB
ALT SERPL-CCNC: 25 U/L
ANION GAP SERPL CALC-SCNC: 5 MMOL/L (ref 0–18)
AST SERPL-CCNC: 27 U/L (ref ?–34)
BUN BLD-MCNC: 14 MG/DL (ref 9–23)
BUN/CREAT SERPL: 14.7 (ref 10–20)
CALCIUM BLD-MCNC: 10.3 MG/DL (ref 8.7–10.4)
CHLORIDE SERPL-SCNC: 106 MMOL/L (ref 98–112)
CHOLEST SERPL-MCNC: 190 MG/DL (ref ?–200)
CO2 SERPL-SCNC: 30 MMOL/L (ref 21–32)
CREAT BLD-MCNC: 0.95 MG/DL
EGFRCR SERPLBLD CKD-EPI 2021: 85 ML/MIN/1.73M2 (ref 60–?)
FASTING PATIENT LIPID ANSWER: YES
FASTING STATUS PATIENT QL REPORTED: YES
GLUCOSE BLD-MCNC: 102 MG/DL (ref 70–99)
HDLC SERPL-MCNC: 65 MG/DL (ref 40–59)
LDLC SERPL CALC-MCNC: 102 MG/DL (ref ?–100)
NONHDLC SERPL-MCNC: 125 MG/DL (ref ?–130)
OSMOLALITY SERPL CALC.SUM OF ELEC: 293 MOSM/KG (ref 275–295)
POTASSIUM SERPL-SCNC: 4.2 MMOL/L (ref 3.5–5.1)
SODIUM SERPL-SCNC: 141 MMOL/L (ref 136–145)
TRIGL SERPL-MCNC: 133 MG/DL (ref 30–149)
VLDLC SERPL CALC-MCNC: 22 MG/DL (ref 0–30)

## 2024-09-10 PROCEDURE — 84450 TRANSFERASE (AST) (SGOT): CPT

## 2024-09-10 PROCEDURE — 80048 BASIC METABOLIC PNL TOTAL CA: CPT

## 2024-09-10 PROCEDURE — 80061 LIPID PANEL: CPT

## 2024-09-10 PROCEDURE — 84460 ALANINE AMINO (ALT) (SGPT): CPT

## 2024-09-10 PROCEDURE — 36415 COLL VENOUS BLD VENIPUNCTURE: CPT

## 2024-09-24 ENCOUNTER — OFFICE VISIT (OUTPATIENT)
Dept: FAMILY MEDICINE CLINIC | Facility: CLINIC | Age: 74
End: 2024-09-24

## 2024-09-24 VITALS
HEIGHT: 69 IN | HEART RATE: 86 BPM | SYSTOLIC BLOOD PRESSURE: 135 MMHG | BODY MASS INDEX: 30.07 KG/M2 | WEIGHT: 203 LBS | DIASTOLIC BLOOD PRESSURE: 73 MMHG

## 2024-09-24 DIAGNOSIS — R35.1 NOCTURIA: ICD-10-CM

## 2024-09-24 DIAGNOSIS — L98.9 SKIN LESION OF FOOT: Primary | ICD-10-CM

## 2024-09-24 PROCEDURE — 99214 OFFICE O/P EST MOD 30 MIN: CPT | Performed by: FAMILY MEDICINE

## 2024-09-24 RX ORDER — EZETIMIBE 10 MG/1
10 TABLET ORAL DAILY
Qty: 90 TABLET | Refills: 3 | Status: SHIPPED | OUTPATIENT
Start: 2024-09-24 | End: 2024-09-26

## 2024-09-24 RX ORDER — AMLODIPINE AND OLMESARTAN MEDOXOMIL 5; 40 MG/1; MG/1
1 TABLET ORAL DAILY
Qty: 90 TABLET | Refills: 0 | COMMUNITY
Start: 2024-09-24 | End: 2024-09-26
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
## 2024-09-26 ENCOUNTER — TELEPHONE (OUTPATIENT)
Dept: FAMILY MEDICINE CLINIC | Facility: CLINIC | Age: 74
End: 2024-09-26

## 2024-09-26 ENCOUNTER — OFFICE VISIT (OUTPATIENT)
Dept: FAMILY MEDICINE CLINIC | Facility: CLINIC | Age: 74
End: 2024-09-26

## 2024-09-26 VITALS — DIASTOLIC BLOOD PRESSURE: 64 MMHG | SYSTOLIC BLOOD PRESSURE: 116 MMHG

## 2024-09-26 DIAGNOSIS — E78.2 MIXED HYPERLIPIDEMIA: ICD-10-CM

## 2024-09-26 PROCEDURE — 99213 OFFICE O/P EST LOW 20 MIN: CPT | Performed by: FAMILY MEDICINE

## 2024-09-26 RX ORDER — LOSARTAN POTASSIUM 50 MG/1
50 TABLET ORAL DAILY
Qty: 90 TABLET | Refills: 1 | Status: SHIPPED | OUTPATIENT
Start: 2024-09-26

## 2024-09-26 RX ORDER — EZETIMIBE 10 MG/1
10 TABLET ORAL DAILY
COMMUNITY
Start: 2024-09-26

## 2024-09-26 RX ORDER — ATORVASTATIN CALCIUM 20 MG/1
TABLET, FILM COATED ORAL
Qty: 90 TABLET | Refills: 3 | Status: SHIPPED | OUTPATIENT
Start: 2024-09-26

## 2024-09-26 NOTE — PROGRESS NOTES
Blood pressure 116/64.      Following up for hypertension was not taking medication as prescribed.  Feels like he is on too much meds feels a little bit lightheaded.    Would like to take losartan only 50 mg.    Objective comfortable no apparent distress    Assessment hypertension    Plan medications adjusted as requested by patient he will follow-up with me in 3 weeks

## 2024-09-26 NOTE — TELEPHONE ENCOUNTER
Patient was called back from waiting area and immediately stated he was not going to be weighed or have blood pressure check as he was just seen in the office 2 days ago.    This MA walked patient to room where continued to state he was just here \"48 hours ago, and wanted only to talk to  about medication in hand\".    Rooming process started, patient refuses to confirm name and , explained to patient that I could not continue charting without verifying who he was. Patient gave name and , stated he was not verifying anything else because we just saw him 2 days ago. Explained to patient the importance of medication verification and blood pressure since he is currently on blood pressure medication.  Patient proceeded to interrupt and state he was just here and would not go over anything else. He yelled profanity and told this MA to leave the room.   was right outside the room as I stepped out, and was informed patient was being impolite.

## 2024-10-08 ENCOUNTER — OFFICE VISIT (OUTPATIENT)
Dept: DERMATOLOGY CLINIC | Facility: CLINIC | Age: 74
End: 2024-10-08

## 2024-10-08 DIAGNOSIS — C43.0 MALIGNANT MELANOMA OF SKIN OF LIP (HCC): ICD-10-CM

## 2024-10-08 DIAGNOSIS — D22.9 MULTIPLE BENIGN NEVI: ICD-10-CM

## 2024-10-08 DIAGNOSIS — D18.01 CHERRY ANGIOMA: ICD-10-CM

## 2024-10-08 DIAGNOSIS — L81.4 LENTIGINES: ICD-10-CM

## 2024-10-08 DIAGNOSIS — L82.1 SEBORRHEIC KERATOSES: Primary | ICD-10-CM

## 2024-10-08 PROCEDURE — 99213 OFFICE O/P EST LOW 20 MIN: CPT | Performed by: STUDENT IN AN ORGANIZED HEALTH CARE EDUCATION/TRAINING PROGRAM

## 2024-10-08 RX ORDER — AMLODIPINE BESYLATE 5 MG/1
5 TABLET ORAL DAILY
COMMUNITY
Start: 2024-09-30

## 2024-10-08 NOTE — PROGRESS NOTES
October 8, 2024    Established patient       CHIEF COMPLAINT: UBSE    HISTORY OF PRESENT ILLNESS: .    - No particular lesions of concern.       DERM HISTORY:  History of melanoma: Yes (L Upper back)     FAMILY HISTORY:  History of melanoma: No    PAST MEDICAL HISTORY:  Past Medical History:    Essential hypertension    High blood pressure    High cholesterol    Hyperlipidemia    Invasive Melanoma (HCC) 0.2 mm    Left upper back    Leg fracture, left    HARDWARE PLACEMENT/REMOVAL       REVIEW OF SYSTEMS:  Constitutional: Denies fever, chills, unintentional weight loss.   Skin as per HPI    Medications  Current Outpatient Medications   Medication Sig Dispense Refill    losartan 50 MG Oral Tab Take 1 tablet (50 mg total) by mouth daily. For hypertension and kidney protection. 90 tablet 1    ezetimibe (ZETIA) 10 MG Oral Tab Take 1 tablet (10 mg total) by mouth daily.      atorvastatin 20 MG Oral Tab TAKE 1 TABLET BY MOUTH EVERY DAY AT NIGHT 90 tablet 3       PHYSICAL EXAM:  Patient declined chaperone   General: awake, alert, no acute distress  Skin: Skin exam was performed today including the following: head and face, scalp, neck, chest (including breasts and axillae), abdomen, back, bilateral upper extremities, bilateral lower extremities, hands.Pertinent findings include:   - Scattered bright red-purple dome-shaped papules on the trunk and extremities   - Scattered light brown stellate macules on sun exposed sites  - Scattered, evenly colored, round brown macules and papules with regular borders on the trunk and extremities  - Numerous scattered skin-colored and brown, waxy, stuck-on papules and plaques on the trunk and extremities  Lymph node exam: No cervical, supraclavicular, axillary or inguinal lymphadenopathy noted on exam today.      ASSESSMENT & PLAN:  Pathophysiology of diagnoses discussed with patient.  Therapeutic options reviewed. Risks, benefits, and alternatives discussed with patient. Instructions  reviewed at length.    #Lentigines  #Seborrheic keratoses   #Cherry angiomas   - Reassurance provided regarding the benign nature of these lesions.    #Multiple benign nevi  - Complete skin exam performed today with no outlier lesions identified   - Reassured patient of benign nature of these lesions.   - Recommend daily photoprotection with broad-spectrum sunscreen, avoidance of sun during peak hours, and sun protective clothing.    - Dermoscopy was used for physical examination of pigmented lesions during today's office visit.    .#History of MM  - No evidence of recurrence on exam. Continued monitoring. Regular skin exams discussed given increased risk of subsequent cutaneous malignancies.   - Should any areas change in size, shape or color, bleed or become tender, the patient will contact the office for evaluation sooner than their interval appointment.      Return to clinic: 3 months or sooner if something concerning arises     Samuel Aguilar MD

## 2024-10-15 ENCOUNTER — OFFICE VISIT (OUTPATIENT)
Dept: FAMILY MEDICINE CLINIC | Facility: CLINIC | Age: 74
End: 2024-10-15

## 2024-10-15 VITALS
HEART RATE: 89 BPM | BODY MASS INDEX: 30.36 KG/M2 | HEIGHT: 69 IN | WEIGHT: 205 LBS | SYSTOLIC BLOOD PRESSURE: 102 MMHG | DIASTOLIC BLOOD PRESSURE: 66 MMHG

## 2024-10-15 DIAGNOSIS — I10 ESSENTIAL HYPERTENSION: Primary | ICD-10-CM

## 2024-10-15 PROCEDURE — 99213 OFFICE O/P EST LOW 20 MIN: CPT | Performed by: FAMILY MEDICINE

## 2024-10-15 NOTE — PROGRESS NOTES
Blood pressure 102/66, pulse 89, height 5' 9\" (1.753 m), weight 205 lb (93 kg).      Feels well.  Exercising.  Drinks a lot of water no chest pain and no dyspnea.    Objective comfortable no apparent distress    Assessment hypertension controlled with some hypotension secondary to medication    Plan discontinue amlodipine    Follow-up in 1 month

## 2024-11-12 ENCOUNTER — OFFICE VISIT (OUTPATIENT)
Dept: FAMILY MEDICINE CLINIC | Facility: CLINIC | Age: 74
End: 2024-11-12
Payer: MEDICARE

## 2024-11-12 ENCOUNTER — HOSPITAL ENCOUNTER (OUTPATIENT)
Dept: GENERAL RADIOLOGY | Age: 74
Discharge: HOME | End: 2024-11-12
Attending: FAMILY MEDICINE
Payer: MEDICARE

## 2024-11-12 VITALS
HEIGHT: 69 IN | DIASTOLIC BLOOD PRESSURE: 86 MMHG | BODY MASS INDEX: 31.25 KG/M2 | WEIGHT: 211 LBS | SYSTOLIC BLOOD PRESSURE: 131 MMHG | HEART RATE: 79 BPM

## 2024-11-12 DIAGNOSIS — R05.1 ACUTE COUGH: ICD-10-CM

## 2024-11-12 DIAGNOSIS — R05.1 ACUTE COUGH: Primary | ICD-10-CM

## 2024-11-12 PROCEDURE — 71046 X-RAY EXAM CHEST 2 VIEWS: CPT | Performed by: FAMILY MEDICINE

## 2024-11-12 PROCEDURE — 99213 OFFICE O/P EST LOW 20 MIN: CPT | Performed by: FAMILY MEDICINE

## 2024-11-12 NOTE — PROGRESS NOTES
Blood pressure 131/86, pulse 79, height 5' 9\" (1.753 m), weight 211 lb (95.7 kg).          Following up for hypertension.  Home blood pressure readings have been good.  Also with a cough since last week no fever.  Slept well last night using Tessie-[place] plus.  No fever.  No difficulty breathing.  Feels well besides the cough.  No nasal congestion or sore throat.    Objective lungs with some wheezing noted left upper lobe    Otherwise clear to auscultation no rales rhonchi or wheezes    Assessment hypertension controlled cough with adventitious lung sounds    Plan stat chest x-ray continue present medications follow-up 6 months

## 2024-11-26 ENCOUNTER — OFFICE VISIT (OUTPATIENT)
Age: 74
End: 2024-11-26
Payer: MEDICARE

## 2024-11-26 VITALS
RESPIRATION RATE: 16 BRPM | DIASTOLIC BLOOD PRESSURE: 90 MMHG | SYSTOLIC BLOOD PRESSURE: 140 MMHG | BODY MASS INDEX: 31 KG/M2 | WEIGHT: 206.81 LBS | HEART RATE: 95 BPM | TEMPERATURE: 98 F | OXYGEN SATURATION: 97 %

## 2024-11-26 DIAGNOSIS — C43.59 MALIGNANT MELANOMA OF UPPER BACK (HCC): Primary | ICD-10-CM

## 2024-11-26 PROCEDURE — 99214 OFFICE O/P EST MOD 30 MIN: CPT | Performed by: SURGERY

## 2024-11-26 NOTE — PROGRESS NOTES
[facility]    Patient Name:  Kang Tavera   YOB: 1950   Gender:  Male   Appt Date: 11/26/2024   Provider:  Grayson Bashir MD   Insurance:  MEDICARE PART B ONLY     PATIENT PROVIDERS  Referring Provider: No ref. provider found   Address: No referring provider defined for this encounter.   Phone #: N/A    Primary Care Provider:Osmin Hooker DO   Address: [address] [place] [address]   Phone #: [phone]       CHIEF COMPLAINT  Chief Complaint   Patient presents with    Follow - Up        PROBLEMS  Reviewed   Patient Active Problem List   Diagnosis    Lumbago    Sebaceous cyst    Colon polyps    Mixed hyperlipidemia    Essential hypertension    Eustachian tube disorder    Welcome to Medicare preventive visit    Dermatitis    Prostate cancer screening    Osteoarthritis of right sacroiliac joint (HCC)        History of Present Illness:  Patient returns for surveillance visit    History of early-stage pT1a malignant melanoma of the left upper back.  Underwent wide local excision in April 2024.  Doing well, pathology without evidence of residual cancer.  Here for surveillance visit.  Overall without any new lumps or bumps or complaints.       Vital Signs:  /90 (BP Location: Left arm, Patient Position: Sitting, Cuff Size: large)   Pulse 95   Temp 98 °F (36.7 °C) (Temporal)   Resp 16   Wt 93.8 kg (206 lb 12.8 oz)   SpO2 97%   BMI 30.54 kg/m²      Medications Reviewed:    Current Outpatient Medications:     losartan 50 MG Oral Tab, Take 1 tablet (50 mg total) by mouth daily. For hypertension and kidney protection., Disp: 90 tablet, Rfl: 1    ezetimibe (ZETIA) 10 MG Oral Tab, Take 1 tablet (10 mg total) by mouth daily., Disp: , Rfl:     atorvastatin 20 MG Oral Tab, TAKE 1 TABLET BY MOUTH EVERY DAY AT NIGHT, Disp: 90 tablet, Rfl: 3     Allergies Reviewed:  No Known Allergies     History:  Reviewed:  Past Medical History:    Essential  hypertension    High blood pressure    High cholesterol    Hyperlipidemia    Invasive Melanoma (HCC) 0.2 mm    Left upper back    Leg fracture, left    HARDWARE PLACEMENT/REMOVAL      Reviewed:  Past Surgical History:   Procedure Laterality Date    Colonoscopy  2015    Colonoscopy N/A 10/23/2018    Procedure: COLONOSCOPY;  Surgeon: Sang Manuel MD;  Location: [facility] ENDO    Colonoscopy N/A 10/31/2023    Procedure: COLONOSCOPY;  Surgeon: Sang Manuel MD;  Location: [facility] ENDO      Reviewed Social History:  Social History     Socioeconomic History    Marital status:    Occupational History    Occupation: Sales   Tobacco Use    Smoking status: Never    Smokeless tobacco: Never   Vaping Use    Vaping status: Never Used   Substance and Sexual Activity    Alcohol use: Yes     Alcohol/week: 5.0 standard drinks of alcohol     Types: 5 Cans of beer per week     Comment: occassionally    Drug use: No   Other Topics Concern    Caffeine Concern No    Pt has a pacemaker No    Pt has a defibrillator No    Reaction to local anesthetic No      Reviewed:  Family History   Problem Relation Age of Onset    Breast Cancer Mother     Diabetes Paternal Grandfather     Other (Other[other]) Father         Review of Systems:  Review of Systems   Constitutional:  Negative for activity change, appetite change, chills, fatigue, fever and unexpected weight change.   HENT:  Negative for mouth sores, nosebleeds and trouble swallowing.    Eyes:  Negative for discharge and redness.   Respiratory:  Negative for cough, shortness of breath and wheezing.    Cardiovascular:  Negative for chest pain.   Gastrointestinal:  Negative for abdominal distention, abdominal pain, blood in stool, nausea and vomiting.   Genitourinary:  Negative for difficulty urinating and dysuria.   Musculoskeletal:  Negative for back pain and gait problem.   Skin:  Negative for color change.   Allergic/Immunologic: Negative for immunocompromised state.    Neurological:  Negative for speech difficulty, weakness and numbness.   Psychiatric/Behavioral:  Negative for confusion.         Physical Examination:  Physical Exam  Constitutional:       General: He is not in acute distress.     Appearance: He is well-developed. He is not diaphoretic.   HENT:      Head: Normocephalic and atraumatic.   Eyes:      General:         Right eye: No discharge.         Left eye: No discharge.      Conjunctiva/sclera: Conjunctivae normal.      Pupils: Pupils are equal, round, and reactive to light.   Neck:      Thyroid: No thyromegaly.   Cardiovascular:      Rate and Rhythm: Normal rate and regular rhythm.      Heart sounds: No murmur heard.  Pulmonary:      Effort: No respiratory distress.      Breath sounds: Normal breath sounds. No wheezing.   Abdominal:      General: Bowel sounds are normal. There is no distension.      Palpations: Abdomen is soft.      Tenderness: There is no abdominal tenderness.      Hernia: No hernia is present.   Musculoskeletal:         General: Normal range of motion.   Skin:     General: Skin is warm and dry.   Neurological:      Mental Status: He is alert and oriented to person, place, and time.          Document Review:  Pathology - 4/17/2024  Final Diagnosis:   Skin, left upper back, shave biopsy:  -Focally invasive melanoma, Breslow depth 0.2 mm, see synoptic report.  -Associated compound nevus        Final Diagnosis:      Skin, left upper back; wide local excision:   Previous biopsy site with dense reactive fibrosis and mild chronic inflammation.  No residual malignant melanoma is identified..  Remaining skin with small intradermal melanocytic nevus (1 mm),, seen at approximately 3 mm from the medial margin.  All inked specimen margins are free of malignancy.     Comment:  The patient had a previous left upper back skin shave biopsy with focally invasive melanoma, Breslow depth 0.2 mm, pT1a ([id number]; 4-).  No residual malignant melanoma is  identified in the current left upper back wide local skin excision.     Assessment / Plan:  T1a melanoma of upper back  Doing well without evidence of disease  Will see him in 6 months and then space out thereafter    Grayson Bashir MD  [facility] Surgical Oncology  [place], [facility]  [email or web address]

## 2025-01-08 ENCOUNTER — OFFICE VISIT (OUTPATIENT)
Dept: DERMATOLOGY CLINIC | Facility: CLINIC | Age: 75
End: 2025-01-08

## 2025-01-08 DIAGNOSIS — L82.0 SEBORRHEIC KERATOSIS, INFLAMED: ICD-10-CM

## 2025-01-08 DIAGNOSIS — L81.4 LENTIGINES: ICD-10-CM

## 2025-01-08 DIAGNOSIS — L82.1 SEBORRHEIC KERATOSES: Primary | ICD-10-CM

## 2025-01-08 PROCEDURE — 99214 OFFICE O/P EST MOD 30 MIN: CPT | Performed by: STUDENT IN AN ORGANIZED HEALTH CARE EDUCATION/TRAINING PROGRAM

## 2025-01-08 NOTE — PROGRESS NOTES
Established patient     Referred by: Osmin Hooker DO     CHIEF COMPLAINT: FBSE    HISTORY OF PRESENT ILLNESS: .    1. Eczema Patch  Location: Left Chin, Right Posterior Knee   Duration: 4 weeks  Bleeding, growing, changing?: No  Scaly?:Yes  Itchy?:Yes  Current treatment: Mometasone x 4 days (with improvement)  Past treatments: Vaseline     2. Growth  Location: Right neck    Duration: Several weeks   Bleeding, growing, changing?: No  Scaly?:Yes  Itchy?:Yes  Painful?: Yes  Current treatment: None  Past treatments: None     3. Wound   Location: Left Heel  Duration: Chronic (50 years)  Bleeding, growing, changing?: No  Scaly?:No    Itchy?:No    Painful?: Yes  Current treatment: None   Past treatments: Consult with Podiatrist specialist- recommended to see Dermatologist     DERM HISTORY:  History of skin cancer: Yes: MM  History of melanoma: Yes: Left Upper Back, 04/24  0.2mm    FAMILY HISTORY:  History of melanoma: No    PAST MEDICAL HISTORY:  Past Medical History:    Essential hypertension    High blood pressure    High cholesterol    Hyperlipidemia    Invasive Melanoma (HCC) 0.2 mm    Left upper back    Leg fracture, left    HARDWARE PLACEMENT/REMOVAL       REVIEW OF SYSTEMS:  Constitutional: Denies fever, chills, unintentional weight loss.   Skin as per HPI    Medications  Current Outpatient Medications   Medication Sig Dispense Refill    losartan 50 MG Oral Tab Take 1 tablet (50 mg total) by mouth daily. For hypertension and kidney protection. 90 tablet 1    ezetimibe (ZETIA) 10 MG Oral Tab Take 1 tablet (10 mg total) by mouth daily.      atorvastatin 20 MG Oral Tab TAKE 1 TABLET BY MOUTH EVERY DAY AT NIGHT 90 tablet 3       PHYSICAL EXAM:  Patient declined chaperone   General: awake, alert, no acute distress  Skin: Skin exam was performed today including the following: head and face, scalp, neck, chest (including breasts and axillae), abdomen, back, bilateral upper extremities, bilateral lower extremities,  hands, feet, digits, nails. Pertinent findings include:   - Scattered bright red-purple dome-shaped papules on the trunk and extremities   - Scattered light brown stellate macules on sun exposed sites  - Scattered, evenly colored, round brown macules and papules with regular borders on the trunk and extremities  - Numerous scattered skin-colored and brown, waxy, stuck-on papules and plaques on the trunk and extremities  - R neck with a pink stuck on papule    ASSESSMENT & PLAN:  Pathophysiology of diagnoses discussed with patient.  Therapeutic options reviewed. Risks, benefits, and alternatives discussed with patient. Instructions reviewed at length.    #Lentigines  #Seborrheic keratoses   #Cherry angiomas   - Reassurance provided regarding the benign nature of these lesions.    #Multiple benign nevi  - Complete skin exam performed today with no outlier lesions identified   - Reassured patient of benign nature of these lesions.   - Recommend daily photoprotection with broad-spectrum sunscreen, avoidance of sun during peak hours, and sun protective clothing.    - Dermoscopy was used for physical examination of pigmented lesions during today's office visit.    #Inflamed seborrheic keratosis  - Reassured regarding benign nature of lesion   - Cryotherapy today. Medically necessary as lesion inflamed and irritated.    - Cryosurgery of non-malignant lesion(s)  - Risks, benefits, alternatives and personnel required for cryosurgery reviewed with patient. Pt verbalizes understanding and wishes to proceed.   - Cryosurgery performed with Liquid Nitrogen via cryostat spray gun to ISK. 1 lesion(s) treated.   - Patient tolerated well and wound care discussed.     #History of MM  - No evidence of recurrence on exam. Continued monitoring. Regular skin exams discussed given increased risk of subsequent cutaneous malignancies.   - Should any areas change in size, shape or color, bleed or become tender, the patient will contact the  office for evaluation sooner than their interval appointment.        Return to clinic:  3 months for first 2 years following MM diagnosis  or sooner if something concerning arises     Samuel Aguilar MD

## 2025-02-05 ENCOUNTER — TELEPHONE (OUTPATIENT)
Dept: FAMILY MEDICINE CLINIC | Facility: CLINIC | Age: 75
End: 2025-02-05

## 2025-02-05 DIAGNOSIS — E78.2 MIXED HYPERLIPIDEMIA: Primary | ICD-10-CM

## 2025-02-05 DIAGNOSIS — I10 ESSENTIAL HYPERTENSION: ICD-10-CM

## 2025-02-19 ENCOUNTER — LAB ENCOUNTER (OUTPATIENT)
Dept: LAB | Age: 75
End: 2025-02-19
Attending: FAMILY MEDICINE
Payer: MEDICARE

## 2025-02-19 DIAGNOSIS — I10 ESSENTIAL HYPERTENSION: ICD-10-CM

## 2025-02-19 DIAGNOSIS — E78.2 MIXED HYPERLIPIDEMIA: ICD-10-CM

## 2025-02-19 LAB
ALT SERPL-CCNC: 30 U/L
ANION GAP SERPL CALC-SCNC: 8 MMOL/L (ref 0–18)
AST SERPL-CCNC: 30 U/L (ref ?–34)
BUN BLD-MCNC: 11 MG/DL (ref 9–23)
BUN/CREAT SERPL: 11.3 (ref 10–20)
CALCIUM BLD-MCNC: 9.9 MG/DL (ref 8.7–10.4)
CHLORIDE SERPL-SCNC: 102 MMOL/L (ref 98–112)
CHOLEST SERPL-MCNC: 135 MG/DL (ref ?–200)
CO2 SERPL-SCNC: 30 MMOL/L (ref 21–32)
CREAT BLD-MCNC: 0.97 MG/DL
EGFRCR SERPLBLD CKD-EPI 2021: 83 ML/MIN/1.73M2 (ref 60–?)
FASTING PATIENT LIPID ANSWER: YES
FASTING STATUS PATIENT QL REPORTED: YES
GLUCOSE BLD-MCNC: 93 MG/DL (ref 70–99)
HDLC SERPL-MCNC: 61 MG/DL (ref 40–59)
LDLC SERPL CALC-MCNC: 53 MG/DL (ref ?–100)
NONHDLC SERPL-MCNC: 74 MG/DL (ref ?–130)
OSMOLALITY SERPL CALC.SUM OF ELEC: 289 MOSM/KG (ref 275–295)
POTASSIUM SERPL-SCNC: 4.7 MMOL/L (ref 3.5–5.1)
SODIUM SERPL-SCNC: 140 MMOL/L (ref 136–145)
TRIGL SERPL-MCNC: 116 MG/DL (ref 30–149)
VLDLC SERPL CALC-MCNC: 17 MG/DL (ref 0–30)

## 2025-02-19 PROCEDURE — 84450 TRANSFERASE (AST) (SGOT): CPT

## 2025-02-19 PROCEDURE — 36415 COLL VENOUS BLD VENIPUNCTURE: CPT

## 2025-02-19 PROCEDURE — 80061 LIPID PANEL: CPT

## 2025-02-19 PROCEDURE — 84460 ALANINE AMINO (ALT) (SGPT): CPT

## 2025-02-19 PROCEDURE — 80048 BASIC METABOLIC PNL TOTAL CA: CPT

## 2025-02-25 ENCOUNTER — OFFICE VISIT (OUTPATIENT)
Dept: FAMILY MEDICINE CLINIC | Facility: CLINIC | Age: 75
End: 2025-02-25

## 2025-02-25 VITALS
HEART RATE: 84 BPM | BODY MASS INDEX: 30.9 KG/M2 | DIASTOLIC BLOOD PRESSURE: 79 MMHG | HEIGHT: 69 IN | WEIGHT: 208.63 LBS | SYSTOLIC BLOOD PRESSURE: 130 MMHG

## 2025-02-25 DIAGNOSIS — E78.2 MIXED HYPERLIPIDEMIA: ICD-10-CM

## 2025-02-25 DIAGNOSIS — I10 ESSENTIAL HYPERTENSION: ICD-10-CM

## 2025-02-25 DIAGNOSIS — M53.3 CHRONIC RIGHT SACROILIAC PAIN: ICD-10-CM

## 2025-02-25 DIAGNOSIS — Z00.00 MEDICARE ANNUAL WELLNESS VISIT, SUBSEQUENT: Primary | ICD-10-CM

## 2025-02-25 DIAGNOSIS — G89.29 CHRONIC RIGHT SACROILIAC PAIN: ICD-10-CM

## 2025-02-25 PROCEDURE — G0439 PPPS, SUBSEQ VISIT: HCPCS | Performed by: FAMILY MEDICINE

## 2025-02-25 RX ORDER — TERBINAFINE HYDROCHLORIDE 250 MG/1
250 TABLET ORAL DAILY
Qty: 90 TABLET | Refills: 0 | Status: SHIPPED | OUTPATIENT
Start: 2025-02-25

## 2025-02-25 NOTE — PROGRESS NOTES
Subjective:   Kang Tavera is a 74 year old male who presents for a Medicare Subsequent Annual Wellness visit (Pt already had Initial Annual Wellness) and scheduled follow up of multiple significant but stable problems.       History/Other:   Fall Risk Assessment:        Cognitive Assessment:   He had a completely normal cognitive assessment - see flowsheet entries     Functional Ability/Status:         Depression Screening (PHQ):            Advanced Directives:   He does NOT have a Living Will. [ ]  He does NOT have a Power of  for Health Care. [ ]  Discussed Advance Care Planning with patient (and family/surrogate if present). Standard forms made available to patient in After Visit Summary.      Patient Active Problem List   Diagnosis    Lumbago    Sebaceous cyst    Colon polyps    Mixed hyperlipidemia    Essential hypertension    Eustachian tube disorder    Welcome to Medicare preventive visit    Dermatitis    Prostate cancer screening    Osteoarthritis of right sacroiliac joint     Allergies:  He has No Known Allergies.    Current Medications:  Outpatient Medications Marked as Taking for the 2/25/25 encounter (Office Visit) with Osmin Hooker, DO   Medication Sig    terbinafine 250 MG Oral Tab Take 1 tablet (250 mg total) by mouth daily.    losartan 50 MG Oral Tab Take 1 tablet (50 mg total) by mouth daily. For hypertension and kidney protection.    ezetimibe (ZETIA) 10 MG Oral Tab Take 1 tablet (10 mg total) by mouth daily.    atorvastatin 20 MG Oral Tab TAKE 1 TABLET BY MOUTH EVERY DAY AT NIGHT       Medical History:  He  has a past medical history of Essential hypertension, High blood pressure, High cholesterol, Hyperlipidemia, Invasive Melanoma (HCC) 0.2 mm (04/2024), and Leg fracture, left.  Surgical History:  He  has a past surgical history that includes colonoscopy (2015); colonoscopy (N/A, 10/23/2018); and colonoscopy (N/A, 10/31/2023).   Family History:  His family history includes  Breast Cancer in his mother; Diabetes in his paternal grandfather; Other in his father.  Social History:  He  reports that he has never smoked. He has never been exposed to tobacco smoke. He has never used smokeless tobacco. He reports current alcohol use of about 5.0 standard drinks of alcohol per week. He reports that he does not use drugs.    Tobacco:  He has never smoked tobacco.    CAGE Alcohol Screen:         Patient Care Team:  Osmin Hooker DO as PCP - General (Family Medicine)  Samuel Aguilar MD (DERMATOLOGY)    Review of Systems  GENERAL: feels well otherwise  SKIN: denies any unusual skin lesions  EYES: denies blurred vision or double vision  HEENT: denies nasal congestion, sinus pain or ST  LUNGS: denies shortness of breath with exertion  CARDIOVASCULAR: denies chest pain on exertion  GI: denies abdominal pain, denies heartburn  : 3 per night nocturia, no complaint of urinary incontinence  MUSCULOSKELETAL: denies back pain  NEURO: denies headaches  PSYCHE: denies depression or anxiety  HEMATOLOGIC: denies hx of anemia  ENDOCRINE: denies thyroid history  ALL/ASTHMA: denies hx of allergy or asthma    Objective:   Physical Exam  General Appearance:  Alert, cooperative, no distress, appears stated age   Head:  Normocephalic, without obvious abnormality, atraumatic   Eyes:  PERRL, conjunctiva/corneas clear, EOM's intact, both eyes   Ears:  Normal TM's and external ear canals, both ears   Nose: Nares normal, septum midline, mucosa normal, no drainage or sinus tenderness   Throat: Lips, mucosa, and tongue normal; teeth and gums normal   Neck: Supple, symmetrical, trachea midline, no adenopathy, thyroid: not enlarged, symmetric, no tenderness/mass/nodules, no carotid bruit or JVD   Back:   Symmetric, no curvature, ROM normal, no CVA tenderness   Lungs:   Clear to auscultation bilaterally, respirations unlabored   Chest Wall:  No tenderness or deformity   Heart:  Regular rate and rhythm, S1, S2 normal,  no murmur, rub or gallop   Abdomen:   Soft, non-tender, bowel sounds active all four quadrants,  no masses, no organomegaly   Genitalia: Normal male   Rectal: Normal tone, normal prostate, no masses or tenderness   Extremities: Extremities normal, atraumatic, no cyanosis or edema   Pulses: 2+ and symmetric   Skin: Skin color, texture, turgor normal, no rashes or lesions   Lymph nodes: Cervical, supraclavicular, and axillary nodes normal   Neurologic: Normal     /79   Pulse 84   Ht 5' 9\" (1.753 m)   Wt 208 lb 9.6 oz (94.6 kg)   BMI 30.80 kg/m²  Estimated body mass index is 30.8 kg/m² as calculated from the following:    Height as of this encounter: 5' 9\" (1.753 m).    Weight as of this encounter: 208 lb 9.6 oz (94.6 kg).    Medicare Hearing Assessment:   Hearing Screening    Time taken: 2/25/2025 11:15 AM  Entry User: Amy Krishna MA  Screening Method: Finger Rub  Finger Rub Result: Pass         Visual Acuity:   Right Eye Visual Acuity: Corrected Right Eye Chart Acuity: 20/20   Left Eye Visual Acuity: Corrected Left Eye Chart Acuity: 20/20   Both Eyes Visual Acuity: Corrected Both Eyes Chart Acuity: 20/20   Able To Tolerate Visual Acuity: Yes        Assessment & Plan:   Kang Tavera is a 74 year old male who presents for a Medicare Assessment.     1. Medicare annual wellness visit, subsequent (Primary)  -     OPHTHALMOLOGY - INTERNAL  -     EKG 12 Lead; Future; Expected date: 02/25/2025  2. Essential hypertension  3. Mixed hyperlipidemia  4. Chronic right sacroiliac pain  -     PHYSIATRY - INTERNAL  Other orders  -     Terbinafine HCl; Take 1 tablet (250 mg total) by mouth daily.  Dispense: 90 tablet; Refill: 0    The patient indicates understanding of these issues and agrees to the plan.  Consult ordered.  Reinforced healthy diet, lifestyle, and exercise.      Return in about 1 week (around 3/4/2025).     Osmin Hooker DO, 2/25/2025     Supplementary Documentation:   General Health:        Health Maintenance   Topic Date Due    Zoster Vaccines (1 of 2) Never done    Annual Depression Screening  01/01/2025    COVID-19 Vaccine (9 - 2024-25 season) 03/30/2025    Annual Physical  02/25/2026    PSA  03/14/2026    Colorectal Cancer Screening  10/31/2028    Influenza Vaccine  Completed    Fall Risk Screening (Annual)  Completed    Pneumococcal Vaccine: 50+ Years  Completed    Meningococcal B Vaccine  Aged Out   1. Medicare annual wellness visit, subsequent  Does not want RSV or shingles vaccines  - OPHTHALMOLOGY - INTERNAL  - EKG 12 Lead; Future    2. Essential hypertension  Will follow-up with blood pressure monitors 1 to 2 weeks has elevated readings today    3. Mixed hyperlipidemia  Continue rosuvastatin    4. Chronic right sacroiliac pain  Referral to physiatry  - PHYSIATRY - INTERNAL

## 2025-03-11 ENCOUNTER — OFFICE VISIT (OUTPATIENT)
Dept: FAMILY MEDICINE CLINIC | Facility: CLINIC | Age: 75
End: 2025-03-11

## 2025-03-11 ENCOUNTER — EKG ENCOUNTER (OUTPATIENT)
Dept: LAB | Age: 75
End: 2025-03-11
Attending: FAMILY MEDICINE
Payer: MEDICARE

## 2025-03-11 VITALS
SYSTOLIC BLOOD PRESSURE: 120 MMHG | DIASTOLIC BLOOD PRESSURE: 77 MMHG | HEIGHT: 69 IN | WEIGHT: 204.63 LBS | BODY MASS INDEX: 30.31 KG/M2 | HEART RATE: 93 BPM

## 2025-03-11 DIAGNOSIS — Z12.5 PROSTATE CANCER SCREENING: ICD-10-CM

## 2025-03-11 DIAGNOSIS — E78.2 MIXED HYPERLIPIDEMIA: Primary | ICD-10-CM

## 2025-03-11 DIAGNOSIS — I10 ESSENTIAL HYPERTENSION: ICD-10-CM

## 2025-03-11 DIAGNOSIS — Z00.00 MEDICARE ANNUAL WELLNESS VISIT, SUBSEQUENT: ICD-10-CM

## 2025-03-11 LAB
ATRIAL RATE: 84 BPM
P AXIS: 53 DEGREES
P-R INTERVAL: 180 MS
Q-T INTERVAL: 352 MS
QRS DURATION: 80 MS
QTC CALCULATION (BEZET): 415 MS
R AXIS: -22 DEGREES
T AXIS: 38 DEGREES
VENTRICULAR RATE: 84 BPM

## 2025-03-11 PROCEDURE — 99212 OFFICE O/P EST SF 10 MIN: CPT | Performed by: FAMILY MEDICINE

## 2025-03-11 PROCEDURE — 93005 ELECTROCARDIOGRAM TRACING: CPT

## 2025-03-11 PROCEDURE — 93010 ELECTROCARDIOGRAM REPORT: CPT | Performed by: STUDENT IN AN ORGANIZED HEALTH CARE EDUCATION/TRAINING PROGRAM

## 2025-03-11 NOTE — PROGRESS NOTES
Blood pressure 120/77, pulse 93, height 5' 9\" (1.753 m), weight 204 lb 9.6 oz (92.8 kg).      PRESENTS TODAY FOLLOWING UP FOR HTN HOME BP READINGS IN RANGE    OBJECTIVE COMFORTABLE NAD    ASSESSMENT HTN CONTROLLED     PLAN CPM FU IN 6 MONTHS.

## 2025-04-01 ENCOUNTER — OFFICE VISIT (OUTPATIENT)
Dept: PHYSICAL MEDICINE AND REHAB | Facility: CLINIC | Age: 75
End: 2025-04-01
Payer: MEDICARE

## 2025-04-01 VITALS
HEART RATE: 71 BPM | WEIGHT: 204 LBS | OXYGEN SATURATION: 98 % | SYSTOLIC BLOOD PRESSURE: 136 MMHG | BODY MASS INDEX: 30.21 KG/M2 | DIASTOLIC BLOOD PRESSURE: 72 MMHG | HEIGHT: 69 IN

## 2025-04-01 DIAGNOSIS — M53.3 PAIN OF RIGHT SACROILIAC JOINT: Primary | ICD-10-CM

## 2025-04-01 PROCEDURE — 99204 OFFICE O/P NEW MOD 45 MIN: CPT | Performed by: PHYSICAL MEDICINE & REHABILITATION

## 2025-04-01 RX ORDER — LOSARTAN POTASSIUM 50 MG/1
50 TABLET ORAL DAILY
Qty: 90 TABLET | Refills: 3 | Status: SHIPPED | OUTPATIENT
Start: 2025-04-01
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
## 2025-04-01 NOTE — TELEPHONE ENCOUNTER
Refill Per Protocol     Requested Prescriptions   Pending Prescriptions Disp Refills    LOSARTAN 50 MG Oral Tab [Pharmacy Med Name: LOSARTAN POTASSIUM 50 MG TAB] 90 tablet 1     Sig: Take 1 tablet (50 mg total) by mouth daily. For hypertension and kidney protection.       Hypertension Medications Protocol Passed - 4/1/2025  2:23 PM        Passed - CMP or BMP in past 12 months        Passed - Last BP reading less than 140/90     BP Readings from Last 1 Encounters:   04/01/25 136/72               Passed - In person appointment or virtual visit in the past 12 mos or appointment in next 3 mos     Recent Outpatient Visits              Today Pain of right sacroiliac joint    [facility] Jesus Powell,     Office Visit    3 weeks ago Mixed hyperlipidemia    [facility] ([place], [place])Osmin Aldrich,     Office Visit    1 month ago Medicare annual wellness visit, subsequent    [facility] [place] [facility]    Office Visit    2 months ago Seborrheic keratoses    [facility] Samuel Aguilar MD    Office Visit    4 months ago Malignant melanoma of upper back (HCC)    [facility] Grayson Bashir MD    Office Visit          Future Appointments         Provider Department Appt Notes    In 1 week Samuel Aguilar MD [facility] follow up    In 4 weeks [facility] MRI [facility] (1.5T WIDE) [facility] MRI - [place]     In 2 months Grayson Bashir MD [facility] 6 month follow up                    Passed - EGFRCR or GFRNAA > 50     GFR Evaluation  EGFRCR: 83 , resulted on 2/19/2025          Passed - Medication is active on med list

## 2025-04-01 NOTE — PROGRESS NOTES
The following individual(s) verbally consented to be recorded using ambient AI listening technology and understand that they can each withdraw their consent to this listening technology at any point by asking the clinician to turn off or pause the recording:    Patient name: Kang Hector Liang

## 2025-04-01 NOTE — PATIENT INSTRUCTIONS
Recommend MRI of the sacrum; depending upon the findings I may recommend follow up vs injection.

## 2025-04-08 ENCOUNTER — OFFICE VISIT (OUTPATIENT)
Dept: DERMATOLOGY CLINIC | Facility: CLINIC | Age: 75
End: 2025-04-08

## 2025-04-08 DIAGNOSIS — L81.4 LENTIGINES: ICD-10-CM

## 2025-04-08 DIAGNOSIS — C43.59 MELANOMA OF BACK (HCC): ICD-10-CM

## 2025-04-08 DIAGNOSIS — L82.1 SEBORRHEIC KERATOSES: Primary | ICD-10-CM

## 2025-04-08 DIAGNOSIS — D18.01 CHERRY ANGIOMA: ICD-10-CM

## 2025-04-08 DIAGNOSIS — D22.9 MULTIPLE BENIGN NEVI: ICD-10-CM

## 2025-04-08 DIAGNOSIS — B35.1 ONYCHOMYCOSIS: ICD-10-CM

## 2025-04-08 PROCEDURE — 99213 OFFICE O/P EST LOW 20 MIN: CPT | Performed by: STUDENT IN AN ORGANIZED HEALTH CARE EDUCATION/TRAINING PROGRAM

## 2025-04-08 RX ORDER — CLOBETASOL PROPIONATE 0.5 MG/G
1 CREAM TOPICAL 2 TIMES DAILY
Qty: 60 G | Refills: 3 | Status: SHIPPED | OUTPATIENT
Start: 2025-04-08 | End: 2026-04-08

## 2025-04-08 RX ORDER — HYDROCORTISONE 25 MG/G
CREAM TOPICAL
Qty: 28 G | Refills: 2 | Status: SHIPPED | OUTPATIENT
Start: 2025-04-08

## 2025-04-08 NOTE — PROGRESS NOTES
Established patient     Referred by:   No referring provider defined for this encounter.     CHIEF COMPLAINT: FBSE    HISTORY OF PRESENT ILLNESS: .    1. Lesion  Location: L lower leg, Nose   Duration: Months   Bleeding, growing, changing?: Yes  Scaly?:Yes  Itchy?:No    Current treatment: None   Past treatments: None     2. Dryness  Location: L Crease of Lip   Duration: Months  Bleeding, growing, changing?: Yes; comes and goes during winter   Scaly?:Yes  Itchy?:No    Current treatment: Mometasone   Past treatments: None       DERM HISTORY:  History of skin cancer: Yes: MM  History of melanoma: Yes: Left Upper Back, 04/24  0.2mm     FAMILY HISTORY:  History of melanoma: No    PAST MEDICAL HISTORY:  Past Medical History:    Essential hypertension    High blood pressure    High cholesterol    Hyperlipidemia    Invasive Melanoma (HCC) 0.2 mm    Left upper back    Leg fracture, left    HARDWARE PLACEMENT/REMOVAL       REVIEW OF SYSTEMS:  Constitutional: Denies fever, chills, unintentional weight loss.   Skin as per HPI    Medications  Current Outpatient Medications   Medication Sig Dispense Refill    losartan 50 MG Oral Tab Take 1 tablet (50 mg total) by mouth daily. For hypertension and kidney protection. 90 tablet 3    ezetimibe (ZETIA) 10 MG Oral Tab Take 1 tablet (10 mg total) by mouth daily.      atorvastatin 20 MG Oral Tab TAKE 1 TABLET BY MOUTH EVERY DAY AT NIGHT 90 tablet 3       PHYSICAL EXAM:  Physician accompanied by chaperone during examination   General: awake, alert, no acute distress  Skin: Skin exam was performed today including the following: head and face, scalp, neck, chest (including breasts and axillae), abdomen, back, bilateral upper extremities, bilateral lower extremities, hands, feet, digits, nails. Pertinent findings include:   - Scattered bright red-purple dome-shaped papules on the trunk and extremities   - Scattered light brown stellate macules on sun exposed sites  - Scattered, evenly  colored, round brown macules and papules with regular borders on the trunk and extremities  - Numerous scattered skin-colored and brown, waxy, stuck-on papules and plaques on the trunk and extremities  -Lymph node exam: No cervical, supraclavicular, axillary or inguinal lymphadenopathy noted on exam today.      ASSESSMENT & PLAN:  Pathophysiology of diagnoses discussed with patient.  Therapeutic options reviewed. Risks, benefits, and alternatives discussed with patient. Instructions reviewed at length.    #Lentigines  #Seborrheic keratoses   #Cherry angiomas   - Reassurance provided regarding the benign nature of these lesions.    #Multiple benign nevi  - Complete skin exam performed today with no outlier lesions identified   - Reassured patient of benign nature of these lesions.   - Recommend daily photoprotection with broad-spectrum sunscreen, avoidance of sun during peak hours, and sun protective clothing.    - Dermoscopy was used for physical examination of pigmented lesions during today's office visit.    #Eczematous Dermatitis, legs    - clobetasol 0.05% twice daily to affected areas Monday-Friday. Take weekends off. Avoid use on face, breasts, groin, or axillae.  - Start hydrocortisone 2.5% to affected areas on face up to twice daily Monday-Friday Take weekends off. Risks, benefits, and alternatives discussed with patient.    #History of MM  - No evidence of recurrence on exam. Continued monitoring. Regular skin exams discussed given increased risk of subsequent cutaneous malignancies.   - Should any areas change in size, shape or color, bleed or become tender, the patient will contact the office for evaluation sooner than their interval appointment.      #Onychomycosis  - Patient is using over the counter antifungal liquid applicator    - Patient instructed to apply daily to take off with nail polish remover once per week.      Return to clinic: 3 months or sooner if something concerning arises     Samuel  MD Jeff    By signing my name below, I, Jorgito HOLLAND MA,  attest that this documentation has been prepared under the direction and in the presence of Samuel Aguilar MD.   Electronically Signed: Jorgito HOLLAND MA, 4/8/2025, 11:34 AM.    I, Samuel Aguilar MD,  personally performed the services described in this documentation. All medical record entries made by the scribe were at my direction and in my presence.  I have reviewed the chart and agree that the record reflects my personal performance and is accurate and complete.  Samuel Aguilar MD, 4/8/2025, 1:47 PM

## 2025-04-29 ENCOUNTER — HOSPITAL ENCOUNTER (OUTPATIENT)
Dept: MRI IMAGING | Age: 75
Discharge: HOME | End: 2025-04-29
Attending: PHYSICAL MEDICINE & REHABILITATION
Payer: MEDICARE

## 2025-04-29 DIAGNOSIS — M53.3 PAIN OF RIGHT SACROILIAC JOINT: ICD-10-CM

## 2025-04-29 PROCEDURE — 72195 MRI PELVIS W/O DYE: CPT | Performed by: PHYSICAL MEDICINE & REHABILITATION

## 2025-05-12 ENCOUNTER — TELEPHONE (OUTPATIENT)
Dept: PHYSICAL MEDICINE AND REHAB | Facility: CLINIC | Age: 75
End: 2025-05-12

## 2025-05-12 DIAGNOSIS — M53.3 PAIN OF RIGHT SACROILIAC JOINT: Primary | ICD-10-CM

## 2025-05-12 DIAGNOSIS — M84.48XG SACRAL INSUFFICIENCY FRACTURE WITH DELAYED HEALING: ICD-10-CM

## 2025-05-12 RX ORDER — CALCITONIN SALMON 200 [IU]/.09ML
1 SPRAY, METERED NASAL DAILY
Qty: 3.7 ML | Refills: 0 | Status: SHIPPED | OUTPATIENT
Start: 2025-05-12

## 2025-06-03 ENCOUNTER — OFFICE VISIT (OUTPATIENT)
Dept: FAMILY MEDICINE CLINIC | Facility: CLINIC | Age: 75
End: 2025-06-03
Payer: MEDICARE

## 2025-06-03 VITALS — DIASTOLIC BLOOD PRESSURE: 79 MMHG | HEART RATE: 80 BPM | SYSTOLIC BLOOD PRESSURE: 134 MMHG

## 2025-06-03 DIAGNOSIS — G89.29 CHRONIC RIGHT SACROILIAC PAIN: Primary | ICD-10-CM

## 2025-06-03 DIAGNOSIS — M53.3 CHRONIC RIGHT SACROILIAC PAIN: Primary | ICD-10-CM

## 2025-06-03 PROCEDURE — 99213 OFFICE O/P EST LOW 20 MIN: CPT | Performed by: FAMILY MEDICINE
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
## 2025-06-17 ENCOUNTER — OFFICE VISIT (OUTPATIENT)
Facility: CLINIC | Age: 75
End: 2025-06-17
Payer: MEDICARE

## 2025-06-17 VITALS
WEIGHT: 203 LBS | BODY MASS INDEX: 30 KG/M2 | OXYGEN SATURATION: 95 % | TEMPERATURE: 98 F | HEART RATE: 86 BPM | DIASTOLIC BLOOD PRESSURE: 79 MMHG | RESPIRATION RATE: 16 BRPM | SYSTOLIC BLOOD PRESSURE: 118 MMHG

## 2025-06-17 DIAGNOSIS — C43.59 MALIGNANT MELANOMA OF UPPER BACK (HCC): Primary | ICD-10-CM

## 2025-06-17 PROCEDURE — 99214 OFFICE O/P EST MOD 30 MIN: CPT | Performed by: SURGERY

## 2025-06-18 NOTE — PROGRESS NOTES
[facility]    Patient Name:  Kang Tavera   YOB: 1950   Gender:  Male   Appt Date: 6/17/2025   Provider:  Grayson Bashir MD   Insurance:  MEDICARE PART B ONLY     PATIENT PROVIDERS  Referring Provider: No ref. provider found   Address: No referring provider defined for this encounter.   Phone #: N/A    Primary Care Provider:Osmin Hooker DO   Address: [address] [place] [address]   Phone #: [phone]       CHIEF COMPLAINT  Chief Complaint   Patient presents with    Follow - Up        PROBLEMS  Reviewed   Patient Active Problem List   Diagnosis    Lumbago    Sebaceous cyst    Colon polyps    Mixed hyperlipidemia    Essential hypertension    Eustachian tube disorder    Welcome to Medicare preventive visit    Dermatitis    Prostate cancer screening    Osteoarthritis of right sacroiliac joint        History of Present Illness:  Patient returns for surveillance visit    History of early-stage pT1a malignant melanoma of the left upper back.  Underwent wide local excision in April 2024.  Doing well, pathology without evidence of residual cancer.  Here for surveillance visit.  Overall without any new lumps or bumps or complaints.       Vital Signs:  /79 (BP Location: Right arm, Patient Position: Sitting, Cuff Size: adult)   Pulse 86   Temp 98.2 °F (36.8 °C) (Oral)   Resp 16   Wt 92.1 kg (203 lb)   SpO2 95%   BMI 29.98 kg/m²      Medications Reviewed:    Current Outpatient Medications:     calcitonin, salmon, 200 UNIT/ACT Nasal Solution, 1 spray by Nasal route daily. 1 spray by nasal route daily for 14 days. Alternate nostril each day, Disp: 3.7 mL, Rfl: 0    clobetasol 0.05 % External Cream, Apply 1 Application topically 2 (two) times daily. Apply twice daily Monday-Friday. Take weekends off. Use on affected areas. Do not use on face, groin, or armpits., Disp: 60 g, Rfl: 3    hydrocortisone 2.5 % External Cream, Apply to the  affected areas on face up to twice daily Monday-Friday with flares. Take weekends off., Disp: 28 g, Rfl: 2    losartan 50 MG Oral Tab, Take 1 tablet (50 mg total) by mouth daily. For hypertension and kidney protection., Disp: 90 tablet, Rfl: 3    ezetimibe (ZETIA) 10 MG Oral Tab, Take 1 tablet (10 mg total) by mouth in the morning., Disp: , Rfl:     atorvastatin 20 MG Oral Tab, TAKE 1 TABLET BY MOUTH EVERY DAY AT NIGHT, Disp: 90 tablet, Rfl: 3     Allergies Reviewed:  No Known Allergies     History:  Reviewed:  Past Medical History:    Essential hypertension    High blood pressure    High cholesterol    Hyperlipidemia    Invasive Melanoma (HCC) 0.2 mm    Left upper back    Leg fracture, left    HARDWARE PLACEMENT/REMOVAL      Reviewed:  Past Surgical History:   Procedure Laterality Date    Colonoscopy  2015    Colonoscopy N/A 10/23/2018    Procedure: COLONOSCOPY;  Surgeon: Sang Manuel MD;  Location: [facility]    Colonoscopy N/A 10/31/2023    Procedure: COLONOSCOPY;  Surgeon: Sang Manuel MD;  Location: [facility]      Reviewed Social History:  Social History     Socioeconomic History    Marital status:    Occupational History    Occupation: [employer]   Tobacco Use    Smoking status: Never     Passive exposure: Never    Smokeless tobacco: Never   Vaping Use    Vaping status: Never Used   Substance and Sexual Activity    Alcohol use: Yes     Alcohol/week: 5.0 standard drinks of alcohol     Types: 5 Cans of beer per week     Comment: occassionally    Drug use: No   Other Topics Concern    Caffeine Concern No    Pt has a pacemaker No    Pt has a defibrillator No    Reaction to local anesthetic No      Reviewed:  Family History   Problem Relation Age of Onset    Breast Cancer Mother     Diabetes Paternal Grandfather     Other (Other[other]) Father         Review of Systems:  Review of Systems   Constitutional:  Negative for activity change, appetite change, chills, fatigue, fever and unexpected weight  change.   HENT:  Negative for mouth sores, nosebleeds and trouble swallowing.    Eyes:  Negative for discharge and redness.   Respiratory:  Negative for cough, shortness of breath and wheezing.    Cardiovascular:  Negative for chest pain.   Gastrointestinal:  Negative for abdominal distention, abdominal pain, blood in stool, nausea and vomiting.   Genitourinary:  Negative for difficulty urinating and dysuria.   Musculoskeletal:  Negative for back pain and gait problem.   Skin:  Negative for color change.   Allergic/Immunologic: Negative for immunocompromised state.   Neurological:  Negative for speech difficulty, weakness and numbness.   Psychiatric/Behavioral:  Negative for confusion.         Physical Examination:  Physical Exam  Constitutional:       General: He is not in acute distress.     Appearance: He is well-developed. He is not diaphoretic.   HENT:      Head: Normocephalic and atraumatic.   Eyes:      General:         Right eye: No discharge.         Left eye: No discharge.      Conjunctiva/sclera: Conjunctivae normal.      Pupils: Pupils are equal, round, and reactive to light.   Neck:      Thyroid: No thyromegaly.   Cardiovascular:      Rate and Rhythm: Normal rate and regular rhythm.      Heart sounds: No murmur heard.  Pulmonary:      Effort: No respiratory distress.      Breath sounds: Normal breath sounds. No wheezing.   Abdominal:      General: Bowel sounds are normal. There is no distension.      Palpations: Abdomen is soft.      Tenderness: There is no abdominal tenderness.      Hernia: No hernia is present.   Musculoskeletal:         General: Normal range of motion.   Skin:     General: Skin is warm and dry.   Neurological:      Mental Status: He is alert and oriented to person, place, and time.          Document Review:  Pathology - 4/17/2024  Final Diagnosis:   Skin, left upper back, shave biopsy:  -Focally invasive melanoma, Breslow depth 0.2 mm, see synoptic report.  -Associated compound nevus         Final Diagnosis:      Skin, left upper back; wide local excision:   Previous biopsy site with dense reactive fibrosis and mild chronic inflammation.  No residual malignant melanoma is identified..  Remaining skin with small intradermal melanocytic nevus (1 mm),, seen at approximately 3 mm from the medial margin.  All inked specimen margins are free of malignancy.     Comment:  The patient had a previous left upper back skin shave biopsy with focally invasive melanoma, Breslow depth 0.2 mm, pT1a ([id number]; 4-).  No residual malignant melanoma is identified in the current left upper back wide local skin excision.     Assessment / Plan:  T1a melanoma of upper back  Doing well without evidence of disease  May follow-up with me on an as-needed basis    Grayson Bashir MD  [facility] Surgical Oncology  [facility]  [email or web address]

## 2025-07-08 ENCOUNTER — OFFICE VISIT (OUTPATIENT)
Dept: PHYSICAL MEDICINE AND REHAB | Facility: CLINIC | Age: 75
End: 2025-07-08
Payer: MEDICARE

## 2025-07-08 VITALS — BODY MASS INDEX: 30.07 KG/M2 | WEIGHT: 203 LBS | HEIGHT: 69 IN

## 2025-07-08 DIAGNOSIS — M53.3 PAIN OF RIGHT SACROILIAC JOINT: Primary | ICD-10-CM

## 2025-07-08 DIAGNOSIS — M84.48XG SACRAL INSUFFICIENCY FRACTURE WITH DELAYED HEALING: ICD-10-CM

## 2025-07-08 NOTE — PROGRESS NOTES
The following individual(s) verbally consented to be recorded using ambient AI listening technology and understand that they can each withdraw their consent to this listening technology at any point by asking the clinician to turn off or pause the recording:    Patient name: Kang Tavera     Progress note    C/C:   Chief Complaint   Patient presents with    Follow - Up     LOV: 4/1/25 Patient is present today to follow up on RIGHT sacroiliac joint pain. Admits some improvement with calcitonin, was advised by Dr. Hooker to f/u with PMR for steroid injection. Reports pain 2-8/10, triggered randomly. Current Medication: Ibuprofen  Patient gives verbal consent to use Abridge.       History of Present Illness  Kang Tavera is a 74 year old male who presents with chronic lower back pain for a follow-up visit.     Chronic lower back pain has persisted for several years, worsening after a fall on icy steps four and a half years ago. The pain is sharp, located in the lower back, radiating into the buttock and down the leg, and occasionally causes limping when walking.    A topical spray was used for fourteen days, reducing pain sharpness, but the pain returned to its original intensity three days after stopping. Ibuprofen provides intermittent relief by reducing swelling. Previous treatments include physical therapy, x-rays, MRI scans, and a cortisone injection at the [facility], which did not alleviate the pain.    He drinks socially on weekends and does not smoke.     Pertinent allergies: Allergies[1]     Physical exam:  Ht 69\"   Wt 203 lb (92.1 kg)   BMI 29.98 kg/m²      Lumbar spine exam:    No skin rash lumbar/upper sacral region  No pain with lumbar flexion. + pain with lumbar extension.  No tenderness to palpation bilateral lumbar paraspinals. + ttp right PSIS.  5/5 LE strength b/l in HF, KE, ADF, EHL, ankle eversion  SILT b/l LE  2/4 quad, gastrocs reflexes b/l  ASHLY test -  right SIJ pain  Gaenslens' test - right SIJ pain    IMAGING: MRI sacrum/coccyx independently reviewed with patient, as was report. There is increased STIR signal along the medial aspect of the right sacroiliac joint.  Assessment & Plan  Chronic right sacroiliac joint pain    Chronic right sacroiliac joint pain is exacerbated by past trauma. MRI suggests possible old trauma or insufficiency fracture. Previous treatments have been ineffective.  Would be reasonable to do a right sacroiliac joint injection under fluoroscopy. I discussed with him that this has to be done under fluoroscopy at the surgery center; he was under the impression we would be able to do this today. I explained that the most accurate way for me to do this for him is to do it under fluoroscopy and we will have to schedule this out. Risks of the procedure include bleeding, infection, nerve injury, HA.     F/u for injection.     Jesus Powell DO  Physical Medicine and Rehabilitation  [facility]         [1] No Known Allergies

## 2025-07-08 NOTE — PROGRESS NOTES
The following individual(s) verbally consented to be recorded using ambient AI listening technology and understand that they can each withdraw their consent to this listening technology at any point by asking the clinician to turn off or pause the recording:    Patient name: Kang Tavera  Additional names:

## 2025-07-16 ENCOUNTER — OFFICE VISIT (OUTPATIENT)
Dept: DERMATOLOGY CLINIC | Facility: CLINIC | Age: 75
End: 2025-07-16

## 2025-07-16 DIAGNOSIS — L81.4 LENTIGINES: ICD-10-CM

## 2025-07-16 DIAGNOSIS — Z85.820 PERSONAL HISTORY OF MALIGNANT MELANOMA OF SKIN: ICD-10-CM

## 2025-07-16 DIAGNOSIS — D18.01 CHERRY ANGIOMA: ICD-10-CM

## 2025-07-16 DIAGNOSIS — L82.1 SEBORRHEIC KERATOSES: Primary | ICD-10-CM

## 2025-07-16 DIAGNOSIS — D22.9 MULTIPLE BENIGN NEVI: ICD-10-CM

## 2025-07-16 PROCEDURE — 99214 OFFICE O/P EST MOD 30 MIN: CPT | Performed by: STUDENT IN AN ORGANIZED HEALTH CARE EDUCATION/TRAINING PROGRAM

## 2025-07-16 NOTE — PROGRESS NOTES
Established patient     Referred by:   No referring provider defined for this encounter.     CHIEF COMPLAINT: FBSE    HISTORY OF PRESENT ILLNESS: .    1. Lesion  Location: Nose   Duration: Months   Bleeding, growing, changing?: Yes  Scaly?:Yes  Itchy?:No    Current treatment: None   Past treatments: None       DERM HISTORY:  History of skin cancer: Yes: MM  History of melanoma: Yes: Left Upper Back, 04/24  0.2mm     FAMILY HISTORY:  History of melanoma: No    PAST MEDICAL HISTORY:  Past Medical History:    Essential hypertension    High blood pressure    High cholesterol    Hyperlipidemia    Invasive Melanoma (HCC) 0.2 mm    Left upper back    Leg fracture, left    HARDWARE PLACEMENT/REMOVAL       REVIEW OF SYSTEMS:  Constitutional: Denies fever, chills, unintentional weight loss.   Skin as per HPI    Medications  Current Outpatient Medications   Medication Sig Dispense Refill    calcitonin, salmon, 200 UNIT/ACT Nasal Solution 1 spray by Nasal route daily. 1 spray by nasal route daily for 14 days. Alternate nostril each day 3.7 mL 0    clobetasol 0.05 % External Cream Apply 1 Application topically 2 (two) times daily. Apply twice daily Monday-Friday. Take weekends off. Use on affected areas. Do not use on face, groin, or armpits. 60 g 3    hydrocortisone 2.5 % External Cream Apply to the affected areas on face up to twice daily Monday-Friday with flares. Take weekends off. 28 g 2    losartan 50 MG Oral Tab Take 1 tablet (50 mg total) by mouth daily. For hypertension and kidney protection. 90 tablet 3    ezetimibe (ZETIA) 10 MG Oral Tab Take 1 tablet (10 mg total) by mouth in the morning.      atorvastatin 20 MG Oral Tab TAKE 1 TABLET BY MOUTH EVERY DAY AT NIGHT 90 tablet 3       PHYSICAL EXAM:  Physician accompanied by chaperone during examination   General: awake, alert, no acute distress  Skin: Skin exam was performed today including the following: head and face, scalp, neck, chest (including breasts and axillae),  abdomen, back, bilateral upper extremities, bilateral lower extremities, hands, feet, digits, nails. Pertinent findings include:   - Scattered bright red-purple dome-shaped papules on the trunk and extremities   - Scattered light brown stellate macules on sun exposed sites  - Scattered, evenly colored, round brown macules and papules with regular borders on the trunk and extremities  - Numerous scattered skin-colored and brown, waxy, stuck-on papules and plaques on the trunk and extremities  -Lymph node exam: No cervical, supraclavicular, axillary or inguinal lymphadenopathy noted on exam today.      ASSESSMENT & PLAN:  Pathophysiology of diagnoses discussed with patient.  Therapeutic options reviewed. Risks, benefits, and alternatives discussed with patient. Instructions reviewed at length.    #Lentigines  #Seborrheic keratoses   #Cherry angiomas   - Reassurance provided regarding the benign nature of these lesions.    #Multiple benign nevi  - Complete skin exam performed today with no outlier lesions identified   - Reassured patient of benign nature of these lesions.   - Recommend daily photoprotection with broad-spectrum sunscreen, avoidance of sun during peak hours, and sun protective clothing.    - Dermoscopy was used for physical examination of pigmented lesions during today's office visit.    #Eczematous Dermatitis, legs    - Continue clobetasol 0.05% twice daily to affected areas Monday-Friday. Take weekends off. Avoid use on face, breasts, groin, or axillae.  - Continue hydrocortisone 2.5% to affected areas on face up to twice daily Monday-Friday Take weekends off. Risks, benefits, and alternatives discussed with patient.    #History of MM  - No evidence of recurrence on exam. Continued monitoring. Regular skin exams discussed given increased risk of subsequent cutaneous malignancies.   - Should any areas change in size, shape or color, bleed or become tender, the patient will contact the office for  evaluation sooner than their interval appointment.    Return to clinic: 3 months or sooner if something concerning arises     Samuel Aguilar MD    By signing my name below, I, Jorgito HOLLAND MA,  attest that this documentation has been prepared under the direction and in the presence of Samuel Aguilra MD.   Electronically Signed: Jorgito HOLLAND MA, 7/16/2025, 11:20 AM.    I, Samuel Aguilar MD,  personally performed the services described in this documentation. All medical record entries made by the scribe were at my direction and in my presence.  I have reviewed the chart and agree that the record reflects my personal performance and is accurate and complete.  Samuel Aguilar MD, 7/16/2025, 12:22 PM

## (undated) DEVICE — ANTIBACTERIAL UNDYED BRAIDED (POLYGLACTIN 910), SYNTHETIC ABSORBABLE SUTURE: Brand: COATED VICRYL

## (undated) DEVICE — SUT PERMA- 2-0 30IN SH NABSRB BLK L26MM 1/

## (undated) DEVICE — FORCEPS BX L240CM DIA2.4MM L NDL RAD JAW 4

## (undated) DEVICE — GAMMEX® PI HYBRID SIZE 6, STERILE POWDER-FREE SURGICAL GLOVE, POLYISOPRENE AND NEOPRENE BLEND: Brand: GAMMEX

## (undated) DEVICE — PROXIMATE RH ROTATING HEAD SKIN STAPLERS (35 WIDE) CONTAINS 35 STAINLESS STEEL STAPLES: Brand: PROXIMATE

## (undated) DEVICE — SOLUTION IRRIG 1000ML 0.9% NACL USP BTL

## (undated) DEVICE — PACK DRP UNIV W/ BK TBL MAYO STD BTM TOP SIDE

## (undated) DEVICE — YANKAUER SUCTION INSTRUMENT NO CONTROL VENT, BULB TIP, CLEAR: Brand: YANKAUER

## (undated) DEVICE — 60 ML SYRINGE REGULAR TIP: Brand: MONOJECT

## (undated) DEVICE — Device

## (undated) DEVICE — STANDARD HYPODERMIC NEEDLE,POLYPROPYLENE HUB: Brand: MONOJECT

## (undated) DEVICE — KIT CLEAN ENDOKIT 1.1OZ GOWNX2

## (undated) DEVICE — SHEET,DRAPE,53X77,STERILE: Brand: MEDLINE

## (undated) DEVICE — INTENDED USE FOR SURGICAL MARKING ON INTACT SKIN, ALSO PROVIDES A PERMANENT METHOD OF IDENTIFYING OBJECTS IN THE OPERATING ROOM: Brand: WRITESITE® PLUS MINI PREP RESISTANT MARKER

## (undated) DEVICE — SUT PDS II 3-0 27IN SH ABSRB VLT L26MM 1/2

## (undated) DEVICE — SUTURE ETHBND XL 2-0 30IN NABSRB GRN 26MM SH 1/2 CIR

## (undated) DEVICE — MEDI-VAC NON-CONDUCTIVE SUCTION TUBING 6MM X 1.8M (6FT.) L: Brand: CARDINAL HEALTH

## (undated) DEVICE — ELECTRODE ES 2.75IN PTFE BLDE MOD E-Z CLN

## (undated) DEVICE — GLOVE SUR 7 SENSICARE PI PIP CRM PWD F

## (undated) DEVICE — SUT VCRL 4-0 18IN ABSRB UD L13MM P-3 3/8

## (undated) DEVICE — 60 ML SYRINGE LUER-LOCK TIP: Brand: MONOJECT

## (undated) DEVICE — Device: Brand: DEFENDO AIR/WATER/SUCTION AND BIOPSY VALVE

## (undated) DEVICE — Device: Brand: DUAL NARE NASAL CANNULAE FEMALE LUER CON 7FT O2 TUBE

## (undated) DEVICE — SUT ETHLN 3-0 18IN PS-2 NABSRB BLK 19MM 3/8 C

## (undated) DEVICE — SUT PROL 2-0 30IN SH NABSRB BLU L26MM 1/2 CIR

## (undated) DEVICE — PROXIMATE SKIN STAPLERS (35 WIDE) CONTAINS 35 STAINLESS STEEL STAPLES (FIXED HEAD): Brand: PROXIMATE

## (undated) DEVICE — ENDOSCOPY PACK - LOWER: Brand: MEDLINE INDUSTRIES, INC.

## (undated) DEVICE — SNARE CAPTIFLEX MICRO-OVL OLY

## (undated) DEVICE — SNARE OPTMZ PLPCTM TRP

## (undated) DEVICE — IMPERVIOUS STOCKINETTE: Brand: DEROYAL

## (undated) DEVICE — MINOR GENERAL: Brand: MEDLINE INDUSTRIES, INC.

## (undated) DEVICE — TOWEL,OR,DSP,ST,BLUE,DLX,2/PK,40PK/CS: Brand: MEDLINE

## (undated) DEVICE — 12 ML SYRINGE LUER-LOCK TIP: Brand: MONOJECT

## (undated) DEVICE — YANKAUER,FLEXIBLE HANDLE,REGLR CAPACITY: Brand: MEDLINE INDUSTRIES, INC.

## (undated) DEVICE — KIT ENDO ORCAPOD 160/180/190

## (undated) NOTE — LETTER
[facility]  [address], [place], [place]  Authorization for Surgical Operation and Procedure                                                                                           I hereby authorize                                                        , MD, my physician and his/her assistants (if applicable), which may include medical students, residents, and/or fellows, to perform the following surgical operation/ procedure and administer such anesthesia as may be determined necessary by my physician: Operation/Procedure name (s) Wide localization of left Back on Kang Tavera   2.   I recognize that during the surgical operation/procedure, unforeseen conditions may necessitate additional or different procedures than those listed above.  I, therefore, further authorize and request that the above-named surgeon, assistants, or designees perform such procedures as are, in their judgment, necessary and desirable.    3.   My surgeon/physician has discussed prior to my surgery the potential benefits, risks and side effects of this procedure; the likelihood of achieving goals; and potential problems that might occur during recuperation.  They also discussed reasonable alternatives to the procedure, including risks, benefits, and side effects related to the alternatives and risks related to not receiving this procedure.  I have had all my questions answered and I acknowledge that no guarantee has been made as to the result that may be obtained.    4.   Should the need arise during my operation/procedure, which includes change of level of care prior to discharge, I also consent to the administration of blood and/or blood products.  Further, I understand that despite careful testing and screening of blood or blood products by collecting agencies, I may still be subject to ill effects as a result of receiving a blood transfusion and/or blood products.  The following are some, but not all, of the  potential risks that can occur: fever and allergic reactions, hemolytic reactions, transmission of diseases such as Hepatitis, AIDS and Cytomegalovirus (CMV) and fluid overload.  In the event that I wish to have an autologous transfusion of my own blood, or a directed donor transfusion, I will discuss this with my physician.  Check only if Refusing Blood or Blood Products  I understand refusal of blood or blood products as deemed necessary by my physician may have serious consequences to my condition to include possible death. I hereby assume responsibility for my refusal and release the hospital, its personnel, and my physicians from any responsibility for the consequences of my refusal.    o  Refuse   5.   I authorize the use of any specimen, organs, tissues, body parts or foreign objects that may be removed from my body during the operation/procedure for diagnosis, research or teaching purposes and their subsequent disposal by hospital authorities.  I also authorize the release of specimen test results and/or written reports to my treating physician on the hospital medical staff or other referring or consulting physicians involved in my care, at the discretion of the Pathologist or my treating physician.    6.   I consent to the photographing or videotaping of the operations or procedures to be performed, including appropriate portions of my body for medical, scientific, or educational purposes, provided my identity is not revealed by the pictures or by descriptive texts accompanying them.  If the procedure has been photographed/videotaped, the surgeon will obtain the original picture, image, videotape or CD.  The hospital will not be responsible for storage, release or maintenance of the picture, image, tape or CD.    7.   I consent to the presence of a  or observers in the operating room as deemed necessary by my physician or their designees.    8.   I recognize that in the event my procedure  results in extended X-Ray/fluoroscopy time, I may develop a skin reaction.    9. If I have a Do Not Attempt Resuscitation (DNAR) order in place, that status will be suspended while in the operating room, procedural suite, and during the recovery period unless otherwise explicitly stated by me (or a person authorized to consent on my behalf). The surgeon or my attending physician will determine when the applicable recovery period ends for purposes of reinstating the DNAR order.  10. Patients having a sterilization procedure: I understand that if the procedure is successful the results will be permanent and it will therefore be impossible for me to inseminate, conceive, or bear children.  I also understand that the procedure is intended to result in sterility, although the result has not been guaranteed.   11. I acknowledge that my physician has explained sedation/analgesia administration to me including the risk and benefits I consent to the administration of sedation/analgesia as may be necessary or desirable in the judgment of my physician.    I CERTIFY THAT I HAVE READ AND FULLY UNDERSTAND THE ABOVE CONSENT TO OPERATION and/or OTHER PROCEDURE.     _________________________________________ _________________________________     ___________________________________  Signature of Patient     Signature of Responsible Person                   Printed Name of Responsible Person                              _________________________________________ ______________________________        ___________________________________  Signature of Witness         Date  Time         Relationship to Patient    STATEMENT OF PHYSICIAN My signature below affirms that prior to the time of the procedure; I have explained to the patient and/or his/her legal representative, the risks and benefits involved in the proposed treatment and any reasonable alternative to the proposed treatment. I have also explained the risks and benefits involved in  refusal of the proposed treatment and alternatives to the proposed treatment and have answered the patient's questions. If I have a significant financial interest in a co-management agreement or a significant financial interest in any product or implant, or other significant relationship used in this procedure/surgery, I have disclosed this and had a discussion with my patient.     _______________________________________________________________ _____________________________  (Signature of Physician)                                                                                         (Date)                                   (Time)  Patient Name: Kang Tavera    : 1950   Printed: 5/3/2024      Medical Record #: [id number]                                              Page 1 of 1

## (undated) NOTE — LETTER
[facility], [facility], [place]  [address] [facility], [place], operated [facility], [address] [place] [address]  Dept: [phone]    6/4/2018        Sheldon Lag[address]re [address]             Dear Noreen Amado,    [address] [facility]

## (undated) NOTE — LETTER
WHERE IS YOUR PAIN NOW?  Jack the areas on your body where you feel the described sensations.  Use the appropriate symbol.  Jack the areas of radiation.  Include all affected areas.  Just to complete the picture, please draw in the face.     ACHE:  ^ ^ ^   NUMBNESS:  0000   PINS & NEEDLES:  = = = =                              ^ ^ ^                       0000              = = = =                                    ^ ^ ^                       0000            = = = =      BURNING:  XXXX   STABBING: ////                  XXXX                ////                         XXXX          ////     Please jack the line below indicating your degree of pain right now  with 0 being no pain 10 being the worst pain possible.                                         0             1             2              3             4              5              6              7             8             9             10         Patient Signature:

## (undated) NOTE — LETTER
AUTHORIZATION FOR SURGICAL OPERATION OR OTHER PROCEDURE    1. I hereby authorize Dr. Osmin Hooker, and [facility] staff assigned to my case to perform the following operation and/or procedure at the [facility] site:    _______________________________________________________________________________________________    MOLE REMOVAL  _______________________________________________________________________________________________    2.  My physician has explained the nature and purpose of the operation or other procedure, possible alternative methods of treatment, the risks involved, and the possibility of complication to me.  I acknowledge that no guarantee has been made as to the result that may be obtained.  3.  I recognize that, during the course of this operation, or other procedure, unforseen conditions may necessitate additional or different procedure than those listed above.  I, therefore, further authorize and request that the above named physician, his/her physician assistants or designees perform such procedures as are, in his/her professional opinion, necessary and desirable.  4.  Any tissue or organs removed in the operation or other procedure may be disposed of by and at the discretion of the [facility] and [facility].  5.  I understand that in the event of a medical emergency, I will be transported by local paramedics to [facility] or other hospital emergency department.  6.  I certify that I have read and fully understand the above consent to operation and/or other procedure.    7.  I acknowledge that my physician has explained sedation/analgesia administration to me including the risks and benefits.  I consent to the administration of sedation/analgesia as may be necessary or desirable in the judgement of my physician.    Witness signature: ___________________________________________________ Date:  ______/______/_____                     Time:  ________ A.M.  P.M.       Patient Name:  ______________________________________________________  (please print)      Patient signature:  ___________________________________________________             Relationship to Patient:           []  Parent    Responsible person                          []  Spouse  In case of minor or                    [] Other  _____________   Incompetent name:  __________________________________________________                               (please print)      _____________      Responsible person  In case of minor or  Incompetent signature:  _______________________________________________    Statement of Physician  My signature below affirms that prior to the time of the procedure, I have explained to the patient and/or his/her guardian, the risks and benefits involved in the proposed treatment and any reasonable alternative to the proposed treatment.  I have also explained the risks and benefits involved in the refusal of the proposed treatment and have answered the patient's questions.                        Date:  ______/______/_______  Provider                      Signature:  __________________________________________________________       Time:  ___________ A.M    P.M.

## (undated) NOTE — LETTER
[address], [place]  Authorization for Invasive Procedures  1.  I hereby authorize Dr. Clemencia Joe* , my physician and whomever may be designated as the doctor's assistant, to perform the following operation and/or procedure:  **COLON performed for the purposes of advancing medicine, science, and/or education, provided my identity is not revealed. If the procedure has been videotaped, the physician/surgeon will obtain the original videotape.  The hospital will not be responsible for stor My signature below affirms that prior to the time of the procedure, I have explained to the patient and/or his legal representative, the risks and benefits involved in the proposed treatment and any reasonable alternative to the proposed treatment.  I have

## (undated) NOTE — LETTER
AUTHORIZATION FOR SURGICAL OPERATION OR OTHER PROCEDURE    1. I hereby authorize Dr. Jiménez, and [facility] staff assigned to my case to perform the following operation and/or procedure at the [facility] site:    Left heel cortisone injection   _______________________________________________________________________________________________      _______________________________________________________________________________________________    2.  My physician has explained the nature and purpose of the operation or other procedure, possible alternative methods of treatment, the risks involved, and the possibility of complication to me.  I acknowledge that no guarantee has been made as to the result that may be obtained.  3.  I recognize that, during the course of this operation, or other procedure, unforseen conditions may necessitate additional or different procedure than those listed above.  I, therefore, further authorize and request that the above named physician, his/her physician assistants or designees perform such procedures as are, in his/her professional opinion, necessary and desirable.  4.  Any tissue or organs removed in the operation or other procedure may be disposed of by and at the discretion of the [facility] and [facility].  5.  I understand that in the event of a medical emergency, I will be transported by local paramedics to [facility] or other hospital emergency department.  6.  I certify that I have read and fully understand the above consent to operation and/or other procedure.    7.  I acknowledge that my physician has explained sedation/analgesia administration to me including the risks and benefits.  I consent to the administration of sedation/analgesia as may be necessary or desirable in the judgement of my physician.    Witness signature: ___________________________________________________ Date:   ______/______/_____                    Time:  ________ A.M.  P.M.       Patient Name:  ______________________________________________________  (please print)      Patient signature:  ___________________________________________________             Relationship to Patient:           []  Parent    Responsible person                          []  Spouse  In case of minor or                    [] Other  _____________   Incompetent name:  __________________________________________________                               (please print)      _____________      Responsible person  In case of minor or  Incompetent signature:  _______________________________________________    Statement of Physician  My signature below affirms that prior to the time of the procedure, I have explained to the patient and/or his/her guardian, the risks and benefits involved in the proposed treatment and any reasonable alternative to the proposed treatment.  I have also explained the risks and benefits involved in the refusal of the proposed treatment and have answered the patient's questions.                        Date:  ______/______/_______  Provider                      Signature:  __________________________________________________________       Time:  ___________ A.M    P.M.

## (undated) NOTE — LETTER
[address] [facility] [address] [place], [place]  Authorization for Surgical Operation and Procedure                                                                                           I hereby authorize Lynne Norris MD, my physician and his/her assistants (if applicable), which may include medical students, residents, and/or fellows, to perform the following surgical operation/ procedure and administer such anesthesia as may be determined necessary by my physician: Operation/Procedure name (s) COLONOSCOPY on Karyle Getting   2. I recognize that during the surgical operation/procedure, unforeseen conditions may necessitate additional or different procedures than those listed above. I, therefore, further authorize and request that the above-named surgeon, assistants, or designees perform such procedures as are, in their judgment, necessary and desirable. 3.   My surgeon/physician has discussed prior to my surgery the potential benefits, risks and side effects of this procedure; the likelihood of achieving goals; and potential problems that might occur during recuperation. They also discussed reasonable alternatives to the procedure, including risks, benefits, and side effects related to the alternatives and risks related to not receiving this procedure. I have had all my questions answered and I acknowledge that no guarantee has been made as to the result that may be obtained. 4.   Should the need arise during my operation/procedure, which includes change of level of care prior to discharge, I also consent to the administration of blood and/or blood products. Further, I understand that despite careful testing and screening of blood or blood products by collecting agencies, I may still be subject to ill effects as a result of receiving a blood transfusion and/or blood products.   The following are some, but not all, of the potential risks that can occur: fever and allergic reactions, hemolytic reactions, transmission of diseases such as Hepatitis, AIDS and Cytomegalovirus (CMV) and fluid overload. In the event that I wish to have an autologous transfusion of my own blood, or a directed donor transfusion, I will discuss this with my physician. Check only if Refusing Blood or Blood Products  I understand refusal of blood or blood products as deemed necessary by my physician may have serious consequences to my condition to include possible death. I hereby assume responsibility for my refusal and release the hospital, its personnel, and my physicians from any responsibility for the consequences of my refusal.    o  Refuse   5. I authorize the use of any specimen, organs, tissues, body parts or foreign objects that may be removed from my body during the operation/procedure for diagnosis, research or teaching purposes and their subsequent disposal by hospital authorities. I also authorize the release of specimen test results and/or written reports to my treating physician on the hospital medical staff or other referring or consulting physicians involved in my care, at the discretion of the Pathologist or my treating physician. 6.   I consent to the photographing or videotaping of the operations or procedures to be performed, including appropriate portions of my body for medical, scientific, or educational purposes, provided my identity is not revealed by the pictures or by descriptive texts accompanying them. If the procedure has been photographed/videotaped, the surgeon will obtain the original picture, image, videotape or CD. The hospital will not be responsible for storage, release or maintenance of the picture, image, tape or CD.    7.   I consent to the presence of a  or observers in the operating room as deemed necessary by my physician or their designees.     8.   I recognize that in the event my procedure results in extended X-Ray/fluoroscopy time, I may develop a skin reaction. 9. If I have a Do Not Attempt Resuscitation (DNAR) order in place, that status will be suspended while in the operating room, procedural suite, and during the recovery period unless otherwise explicitly stated by me (or a person authorized to consent on my behalf). The surgeon or my attending physician will determine when the applicable recovery period ends for purposes of reinstating the DNAR order. 10. Patients having a sterilization procedure: I understand that if the procedure is successful the results will be permanent and it will therefore be impossible for me to inseminate, conceive, or bear children. I also understand that the procedure is intended to result in sterility, although the result has not been guaranteed. 11. I acknowledge that my physician has explained sedation/analgesia administration to me including the risk and benefits I consent to the administration of sedation/analgesia as may be necessary or desirable in the judgment of my physician. I CERTIFY THAT I HAVE READ AND FULLY UNDERSTAND THE ABOVE CONSENT TO OPERATION and/or OTHER PROCEDURE.     _________________________________________ _________________________________     ___________________________________  Signature of Patient     Signature of Responsible Person                   Printed Name of Responsible Person                              _________________________________________ ______________________________        ___________________________________  Signature of Witness         Date  Time         Relationship to Patient    STATEMENT OF PHYSICIAN My signature below affirms that prior to the time of the procedure; I have explained to the patient and/or his/her legal representative, the risks and benefits involved in the proposed treatment and any reasonable alternative to the proposed treatment.  I have also explained the risks and benefits involved in refusal of the proposed treatment and alternatives to the proposed treatment and have answered the patient's questions.  If I have a significant financial interest in a co-management agreement or a significant financial interest in any product or implant, or other significant relationship used in this procedure/surgery, I have disclosed this and had a discussion with my patient.     _______________________________________________________________ _____________________________  Willow Yolanda of Physician)                                                                                         (Date)                                   (Time)  Patient Name: Tamera Kulkarni    : 1950   Printed: 10/30/2023      Medical Record #: [id number]                                              Page 1 of 1

## (undated) NOTE — LETTER
[facility]  Administration of Anesthesia  1. Davidson Dsouza, or _________________________________ acting on his behalf, (Patient) (Dependent/Representative) request to receive anesthesia for my pending procedure/operation/treatment.   A hireny infections, high spinal block, spinal bleeding, seizure, cardiac arrest and death. 7. AWARENESS: I understand that it is possible (but unlikely) to have explicit memory of events from the operating room while under general anesthesia.   8. ELECTROCONVULSIV unconscious pt /Relationship    My signature below affirms that prior to the time of the procedure, I have explained to the patient and/or his/her guardian, the risks and benefits of undergoing anesthesia, as well as any reasonable alternatives.     _______

## (undated) NOTE — LETTER
AUTHORIZATION FOR SURGICAL OPERATION OR OTHER PROCEDURE    1. I hereby authorize Dr. Kavya Jiménez, and [facility] staff assigned to my case to perform the following operation and/or procedure at the [facility] site:    _______________________________________________________________________________________________    Left heel cortisone injection  _______________________________________________________________________________________________    2.  My physician has explained the nature and purpose of the operation or other procedure, possible alternative methods of treatment, the risks involved, and the possibility of complication to me.  I acknowledge that no guarantee has been made as to the result that may be obtained.  3.  I recognize that, during the course of this operation, or other procedure, unforseen conditions may necessitate additional or different procedure than those listed above.  I, therefore, further authorize and request that the above named physician, his/her physician assistants or designees perform such procedures as are, in his/her professional opinion, necessary and desirable.  4.  Any tissue or organs removed in the operation or other procedure may be disposed of by and at the discretion of the [facility].  5.  I understand that in the event of a medical emergency, I will be transported by local paramedics to [facility] or other hospital emergency department.  6.  I certify that I have read and fully understand the above consent to operation and/or other procedure.    7.  I acknowledge that my physician has explained sedation/analgesia administration to me including the risks and benefits.  I consent to the administration of sedation/analgesia as may be necessary or desirable in the judgement of my physician.    Witness signature: ___________________________________________________ Date:   ______/______/_____                    Time:  ________ A.M.  P.M.       Patient Name:  ______________________________________________________  (please print)      Patient signature:  ___________________________________________________             Relationship to Patient:           []  Parent    Responsible person                          []  Spouse  In case of minor or                    [] Other  _____________   Incompetent name:  __________________________________________________                               (please print)      _____________      Responsible person  In case of minor or  Incompetent signature:  _______________________________________________    Statement of Physician  My signature below affirms that prior to the time of the procedure, I have explained to the patient and/or his/her guardian, the risks and benefits involved in the proposed treatment and any reasonable alternative to the proposed treatment.  I have also explained the risks and benefits involved in the refusal of the proposed treatment and have answered the patient's questions.                        Date:  ______/______/_______  Provider                      Signature:  __________________________________________________________       Time:  ___________ A.M    P.M.

## (undated) NOTE — MR AVS SNAPSHOT
[facility]  [address] [place] [address], [address] [place] [address]               Thank you for choosing us for your health care visit with Efrain Goel. DO Nhung.   We are glad to serve you and happy to provide you with this summary Phone:  [phone]   Fax:  [phone]    Diagnoses:  Dermatitis   Order:  Shana Burns - Internal    [facility], [address] [facility] [address]   Phone:  [phone]   Fax:  [phone]         Referral Orders      Normal Orders This Visit    D Water is best for hydration Fast food. Eat at home when possible     Tips for increasing your physical activity – Adults who are physically active are less likely to develop some chronic diseases than adults who are inactive.      HOW TO GET STARTED: HOW

## (undated) NOTE — MR AVS SNAPSHOT
[facility]  [address] [place] [address], [address] [place] [address]               Thank you for choosing us for your health care visit with Manuela Keller MD.  We are glad to serve you and happy to provide you with this summary of

## (undated) NOTE — LETTER
[facility]  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   I recognize that during the surgical operation/procedure, unforeseen conditions may necessitate additional or different procedures than those listed above.  I, therefore, further authorize and request that the above-named surgeon, assistants, or designees perform such procedures as are, in their judgment, necessary and desirable.    3.   My surgeon/physician has discussed prior to my surgery the potential benefits, risks and side effects of this procedure; the likelihood of achieving goals; and potential problems that might occur during recuperation.  They also discussed reasonable alternatives to the procedure, including risks, benefits, and side effects related to the alternatives and risks related to not receiving this procedure.  I have had all my questions answered and I acknowledge that no guarantee has been made as to the result that may be obtained.    4.   Should the need arise during my operation/procedure, which includes change of level of care prior to discharge, I also consent to the administration of blood and/or blood products.  Further, I understand that despite careful testing and screening of blood or blood products by collecting agencies, I may still be subject to ill effects as a result of receiving a blood transfusion and/or blood products.  The following are some, but not all, of the potential risks that can  occur: fever and allergic reactions, hemolytic reactions, transmission of diseases such as Hepatitis, AIDS and Cytomegalovirus (CMV) and fluid overload.  In the event that I wish to have an autologous transfusion of my own blood, or a directed donor transfusion, I will discuss this with my physician.  Check only if Refusing Blood or Blood Products  I understand refusal of blood or blood products as deemed necessary by my physician may have serious consequences to my condition to include possible death. I hereby assume responsibility for my refusal and release the hospital, its personnel, and my physicians from any responsibility for the consequences of my refusal.    o  Refuse   5.   I authorize the use of any specimen, organs, tissues, body parts or foreign objects that may be removed from my body during the operation/procedure for diagnosis, research or teaching purposes and their subsequent disposal by hospital authorities.  I also authorize the release of specimen test results and/or written reports to my treating physician on the hospital medical staff or other referring or consulting physicians involved in my care, at the discretion of the Pathologist or my treating physician.    6.   I consent to the photographing or videotaping of the operations or procedures to be performed, including appropriate portions of my body for medical, scientific, or educational purposes, provided my identity is not revealed by the pictures or by descriptive texts accompanying them.  If the procedure has been photographed/videotaped, the surgeon will obtain the original picture, image, videotape or CD.  The hospital will not be responsible for storage, release or maintenance of the picture, image, tape or CD.    7.   I consent to the presence of a  or observers in the operating room as deemed necessary by my physician or their designees.    8.   I recognize that in the event my procedure results in extended  X-Ray/fluoroscopy time, I may develop a skin reaction.    9. If I have a Do Not Attempt Resuscitation (DNAR) order in place, that status will be suspended while in the operating room, procedural suite, and during the recovery period unless otherwise explicitly stated by me (or a person authorized to consent on my behalf). The surgeon or my attending physician will determine when the applicable recovery period ends for purposes of reinstating the DNAR order.  10. Patients having a sterilization procedure: I understand that if the procedure is successful the results will be permanent and it will therefore be impossible for me to inseminate, conceive, or bear children.  I also understand that the procedure is intended to result in sterility, although the result has not been guaranteed.   11. I acknowledge that my physician has explained sedation/analgesia administration to me including the risk and benefits I consent to the administration of sedation/analgesia as may be necessary or desirable in the judgment of my physician.    I CERTIFY THAT I HAVE READ AND FULLY UNDERSTAND THE ABOVE CONSENT TO OPERATION and/or OTHER PROCEDURE.     _________________________________________ _________________________________     ___________________________________  Signature of Patient     Signature of Responsible Person                   Printed Name of Responsible Person                              _________________________________________ ______________________________        ___________________________________  Signature of Witness         Date  Time         Relationship to Patient    STATEMENT OF PHYSICIAN My signature below affirms that prior to the time of the procedure; I have explained to the patient and/or his/her legal representative, the risks and benefits involved in the proposed treatment and any reasonable alternative to the proposed treatment. I have also explained the risks and benefits involved in refusal of the  proposed treatment and alternatives to the proposed treatment and have answered the patient's questions. If I have a significant financial interest in a co-management agreement or a significant financial interest in any product or implant, or other significant relationship used in this procedure/surgery, I have disclosed this and had a discussion with my patient.     _______________________________________________________________ _____________________________  (Signature of Physician)                                                                                         (Date)                                   (Time)  Patient Name: Kang Tavera    : 1950   Printed: 5/3/2024      Medical Record #: [id number]                                              Page 1 of 1

## (undated) NOTE — LETTER
8/25/2023    [address]            Dear Enoch Go,      Our records indicate that you are due for an appointment for a Colonoscopy with Myesha Galan MD. Our doctors are booking out about 3-5 months in advance for procedures. Please call our office to schedule a phone screening appointment to plan for the procedure(s). Your medical well-being is important to us. If your insurance requires a referral, please call your primary care office to request one.       Thank you,      The Physicians and Staff at [facility]

## (undated) NOTE — LETTER
09/20/21        Carin Cervantes  [address]      Dear Raman Oneill records indicate that you have outstanding lab work and or testing that was ordered for you and has not yet been completed:  Orders Placed This Encounter      CMP [